# Patient Record
Sex: FEMALE | Race: WHITE | NOT HISPANIC OR LATINO
[De-identification: names, ages, dates, MRNs, and addresses within clinical notes are randomized per-mention and may not be internally consistent; named-entity substitution may affect disease eponyms.]

---

## 2017-01-19 ENCOUNTER — APPOINTMENT (OUTPATIENT)
Dept: WOUND CARE | Facility: CLINIC | Age: 82
End: 2017-01-19

## 2017-01-19 VITALS
TEMPERATURE: 98 F | WEIGHT: 113 LBS | HEIGHT: 62 IN | SYSTOLIC BLOOD PRESSURE: 136 MMHG | HEART RATE: 71 BPM | BODY MASS INDEX: 20.8 KG/M2 | DIASTOLIC BLOOD PRESSURE: 70 MMHG | RESPIRATION RATE: 14 BRPM

## 2017-01-19 DIAGNOSIS — Z79.01 LONG TERM (CURRENT) USE OF ANTICOAGULANTS: ICD-10-CM

## 2017-01-19 RX ORDER — CIPROFLOXACIN HYDROCHLORIDE 250 MG/1
250 TABLET, FILM COATED ORAL
Qty: 10 | Refills: 0 | Status: COMPLETED | COMMUNITY
Start: 2016-10-19

## 2017-01-19 RX ORDER — CEPHALEXIN 500 MG/1
500 CAPSULE ORAL
Qty: 14 | Refills: 0 | Status: COMPLETED | COMMUNITY
Start: 2016-10-24

## 2017-01-19 RX ORDER — NITROFURANTOIN (MONOHYDRATE/MACROCRYSTALS) 25; 75 MG/1; MG/1
100 CAPSULE ORAL
Qty: 14 | Refills: 0 | Status: COMPLETED | COMMUNITY
Start: 2016-08-26

## 2017-01-19 RX ORDER — DOXYCYCLINE HYCLATE 100 MG/1
100 CAPSULE ORAL
Qty: 14 | Refills: 0 | Status: COMPLETED | COMMUNITY
Start: 2016-09-19

## 2017-02-07 ENCOUNTER — APPOINTMENT (OUTPATIENT)
Dept: WOUND CARE | Facility: CLINIC | Age: 82
End: 2017-02-07

## 2017-02-21 ENCOUNTER — APPOINTMENT (OUTPATIENT)
Dept: OPHTHALMOLOGY | Facility: CLINIC | Age: 82
End: 2017-02-21

## 2017-02-28 ENCOUNTER — APPOINTMENT (OUTPATIENT)
Dept: OPHTHALMOLOGY | Facility: CLINIC | Age: 82
End: 2017-02-28

## 2017-04-05 ENCOUNTER — APPOINTMENT (OUTPATIENT)
Dept: OPHTHALMOLOGY | Facility: AMBULATORY SURGERY CENTER | Age: 82
End: 2017-04-05

## 2017-04-06 ENCOUNTER — APPOINTMENT (OUTPATIENT)
Dept: OPHTHALMOLOGY | Facility: CLINIC | Age: 82
End: 2017-04-06

## 2017-04-13 ENCOUNTER — APPOINTMENT (OUTPATIENT)
Dept: OPHTHALMOLOGY | Facility: CLINIC | Age: 82
End: 2017-04-13

## 2017-05-03 ENCOUNTER — APPOINTMENT (OUTPATIENT)
Dept: OPHTHALMOLOGY | Facility: AMBULATORY SURGERY CENTER | Age: 82
End: 2017-05-03

## 2017-05-04 ENCOUNTER — APPOINTMENT (OUTPATIENT)
Dept: OPHTHALMOLOGY | Facility: CLINIC | Age: 82
End: 2017-05-04

## 2017-06-06 ENCOUNTER — APPOINTMENT (OUTPATIENT)
Dept: OPHTHALMOLOGY | Facility: CLINIC | Age: 82
End: 2017-06-06

## 2017-09-12 ENCOUNTER — APPOINTMENT (OUTPATIENT)
Dept: OPHTHALMOLOGY | Facility: CLINIC | Age: 82
End: 2017-09-12
Payer: MEDICARE

## 2017-09-12 PROCEDURE — 92014 COMPRE OPH EXAM EST PT 1/>: CPT

## 2017-11-20 ENCOUNTER — EMERGENCY (EMERGENCY)
Facility: HOSPITAL | Age: 82
LOS: 1 days | Discharge: HOME | End: 2017-11-20
Attending: EMERGENCY MEDICINE | Admitting: EMERGENCY MEDICINE
Payer: MEDICARE

## 2017-11-20 VITALS
TEMPERATURE: 98 F | SYSTOLIC BLOOD PRESSURE: 204 MMHG | OXYGEN SATURATION: 98 % | HEART RATE: 72 BPM | DIASTOLIC BLOOD PRESSURE: 109 MMHG | RESPIRATION RATE: 16 BRPM

## 2017-11-20 DIAGNOSIS — Z90.49 ACQUIRED ABSENCE OF OTHER SPECIFIED PARTS OF DIGESTIVE TRACT: Chronic | ICD-10-CM

## 2017-11-20 LAB
ALBUMIN SERPL ELPH-MCNC: 4 G/DL — SIGNIFICANT CHANGE UP (ref 3.3–5)
ALP SERPL-CCNC: 92 U/L — SIGNIFICANT CHANGE UP (ref 40–120)
ALT FLD-CCNC: 18 U/L RC — SIGNIFICANT CHANGE UP (ref 10–45)
ANION GAP SERPL CALC-SCNC: 15 MMOL/L — SIGNIFICANT CHANGE UP (ref 5–17)
APPEARANCE UR: ABNORMAL
APTT BLD: 31.6 SEC — SIGNIFICANT CHANGE UP (ref 27.5–37.4)
AST SERPL-CCNC: 20 U/L — SIGNIFICANT CHANGE UP (ref 10–40)
BACTERIA # UR AUTO: ABNORMAL /HPF
BASE EXCESS BLDV CALC-SCNC: 3.5 MMOL/L — HIGH (ref -2–2)
BASOPHILS # BLD AUTO: 0 K/UL — SIGNIFICANT CHANGE UP (ref 0–0.2)
BASOPHILS NFR BLD AUTO: 0.4 % — SIGNIFICANT CHANGE UP (ref 0–2)
BILIRUB SERPL-MCNC: 0.6 MG/DL — SIGNIFICANT CHANGE UP (ref 0.2–1.2)
BILIRUB UR-MCNC: NEGATIVE — SIGNIFICANT CHANGE UP
BUN SERPL-MCNC: 35 MG/DL — HIGH (ref 7–23)
CA-I SERPL-SCNC: 1.19 MMOL/L — SIGNIFICANT CHANGE UP (ref 1.12–1.3)
CALCIUM SERPL-MCNC: 9.2 MG/DL — SIGNIFICANT CHANGE UP (ref 8.4–10.5)
CHLORIDE BLDV-SCNC: 105 MMOL/L — SIGNIFICANT CHANGE UP (ref 96–108)
CHLORIDE SERPL-SCNC: 102 MMOL/L — SIGNIFICANT CHANGE UP (ref 96–108)
CK SERPL-CCNC: 28 U/L — SIGNIFICANT CHANGE UP (ref 25–170)
CO2 BLDV-SCNC: 31 MMOL/L — HIGH (ref 22–30)
CO2 SERPL-SCNC: 25 MMOL/L — SIGNIFICANT CHANGE UP (ref 22–31)
COLOR SPEC: SIGNIFICANT CHANGE UP
CREAT SERPL-MCNC: 1.43 MG/DL — HIGH (ref 0.5–1.3)
DIFF PNL FLD: ABNORMAL
EOSINOPHIL # BLD AUTO: 0 K/UL — SIGNIFICANT CHANGE UP (ref 0–0.5)
EOSINOPHIL NFR BLD AUTO: 0.3 % — SIGNIFICANT CHANGE UP (ref 0–6)
GAS PNL BLDV: 141 MMOL/L — SIGNIFICANT CHANGE UP (ref 136–145)
GAS PNL BLDV: SIGNIFICANT CHANGE UP
GAS PNL BLDV: SIGNIFICANT CHANGE UP
GLUCOSE BLDV-MCNC: 144 MG/DL — HIGH (ref 70–99)
GLUCOSE SERPL-MCNC: 150 MG/DL — HIGH (ref 70–99)
GLUCOSE UR QL: NEGATIVE — SIGNIFICANT CHANGE UP
HCO3 BLDV-SCNC: 30 MMOL/L — HIGH (ref 21–29)
HCT VFR BLD CALC: 39.6 % — SIGNIFICANT CHANGE UP (ref 34.5–45)
HCT VFR BLDA CALC: 40 % — SIGNIFICANT CHANGE UP (ref 39–50)
HGB BLD CALC-MCNC: 12.9 G/DL — SIGNIFICANT CHANGE UP (ref 11.5–15.5)
HGB BLD-MCNC: 13 G/DL — SIGNIFICANT CHANGE UP (ref 11.5–15.5)
HOROWITZ INDEX BLDV+IHG-RTO: SIGNIFICANT CHANGE UP
INR BLD: 1.21 RATIO — HIGH (ref 0.88–1.16)
KETONES UR-MCNC: NEGATIVE — SIGNIFICANT CHANGE UP
LACTATE BLDV-MCNC: 1.3 MMOL/L — SIGNIFICANT CHANGE UP (ref 0.7–2)
LEUKOCYTE ESTERASE UR-ACNC: ABNORMAL
LYMPHOCYTES # BLD AUTO: 0.4 K/UL — LOW (ref 1–3.3)
LYMPHOCYTES # BLD AUTO: 4.3 % — LOW (ref 13–44)
MCHC RBC-ENTMCNC: 31.8 PG — SIGNIFICANT CHANGE UP (ref 27–34)
MCHC RBC-ENTMCNC: 32.8 GM/DL — SIGNIFICANT CHANGE UP (ref 32–36)
MCV RBC AUTO: 96.9 FL — SIGNIFICANT CHANGE UP (ref 80–100)
MONOCYTES # BLD AUTO: 0.3 K/UL — SIGNIFICANT CHANGE UP (ref 0–0.9)
MONOCYTES NFR BLD AUTO: 3.9 % — SIGNIFICANT CHANGE UP (ref 2–14)
NEUTROPHILS # BLD AUTO: 8.2 K/UL — HIGH (ref 1.8–7.4)
NEUTROPHILS NFR BLD AUTO: 91.1 % — HIGH (ref 43–77)
NITRITE UR-MCNC: POSITIVE
NT-PROBNP SERPL-SCNC: 2890 PG/ML — HIGH (ref 0–300)
PCO2 BLDV: 53 MMHG — HIGH (ref 35–50)
PH BLDV: 7.36 — SIGNIFICANT CHANGE UP (ref 7.35–7.45)
PH UR: 7.5 — SIGNIFICANT CHANGE UP (ref 5–8)
PLATELET # BLD AUTO: 106 K/UL — LOW (ref 150–400)
PO2 BLDV: 32 MMHG — SIGNIFICANT CHANGE UP (ref 25–45)
POTASSIUM BLDV-SCNC: 4.5 MMOL/L — SIGNIFICANT CHANGE UP (ref 3.5–5)
POTASSIUM SERPL-MCNC: 4.7 MMOL/L — SIGNIFICANT CHANGE UP (ref 3.5–5.3)
POTASSIUM SERPL-SCNC: 4.7 MMOL/L — SIGNIFICANT CHANGE UP (ref 3.5–5.3)
PROT SERPL-MCNC: 7.7 G/DL — SIGNIFICANT CHANGE UP (ref 6–8.3)
PROT UR-MCNC: 100 MG/DL
PROTHROM AB SERPL-ACNC: 13.2 SEC — HIGH (ref 9.8–12.7)
RBC # BLD: 4.08 M/UL — SIGNIFICANT CHANGE UP (ref 3.8–5.2)
RBC # FLD: 13.9 % — SIGNIFICANT CHANGE UP (ref 10.3–14.5)
RBC CASTS # UR COMP ASSIST: SIGNIFICANT CHANGE UP /HPF (ref 0–2)
SAO2 % BLDV: 52 % — LOW (ref 67–88)
SODIUM SERPL-SCNC: 142 MMOL/L — SIGNIFICANT CHANGE UP (ref 135–145)
SP GR SPEC: 1.01 — SIGNIFICANT CHANGE UP (ref 1.01–1.02)
TROPONIN T SERPL-MCNC: <0.01 NG/ML — SIGNIFICANT CHANGE UP (ref 0–0.06)
UROBILINOGEN FLD QL: NEGATIVE — SIGNIFICANT CHANGE UP
WBC # BLD: 9 K/UL — SIGNIFICANT CHANGE UP (ref 3.8–10.5)
WBC # FLD AUTO: 9 K/UL — SIGNIFICANT CHANGE UP (ref 3.8–10.5)
WBC UR QL: >50 /HPF (ref 0–5)

## 2017-11-20 PROCEDURE — 93010 ELECTROCARDIOGRAM REPORT: CPT

## 2017-11-20 PROCEDURE — 99285 EMERGENCY DEPT VISIT HI MDM: CPT | Mod: 25

## 2017-11-20 RX ORDER — LABETALOL HCL 100 MG
10 TABLET ORAL ONCE
Qty: 0 | Refills: 0 | Status: COMPLETED | OUTPATIENT
Start: 2017-11-20 | End: 2017-11-20

## 2017-11-20 RX ORDER — CEFTRIAXONE 500 MG/1
1 INJECTION, POWDER, FOR SOLUTION INTRAMUSCULAR; INTRAVENOUS ONCE
Qty: 0 | Refills: 0 | Status: COMPLETED | OUTPATIENT
Start: 2017-11-20 | End: 2017-11-20

## 2017-11-20 RX ORDER — ONDANSETRON 8 MG/1
4 TABLET, FILM COATED ORAL ONCE
Qty: 0 | Refills: 0 | Status: COMPLETED | OUTPATIENT
Start: 2017-11-20 | End: 2017-11-20

## 2017-11-20 RX ADMIN — ONDANSETRON 4 MILLIGRAM(S): 8 TABLET, FILM COATED ORAL at 19:08

## 2017-11-20 RX ADMIN — Medication 10 MILLIGRAM(S): at 19:08

## 2017-11-20 RX ADMIN — ONDANSETRON 4 MILLIGRAM(S): 8 TABLET, FILM COATED ORAL at 21:31

## 2017-11-20 RX ADMIN — CEFTRIAXONE 100 GRAM(S): 500 INJECTION, POWDER, FOR SOLUTION INTRAMUSCULAR; INTRAVENOUS at 21:31

## 2017-11-20 NOTE — ED ADULT NURSE REASSESSMENT NOTE - NS ED NURSE REASSESS COMMENT FT1
Patient reports nausea. Zofran IV given. UTI dx from urine sample. Ceftriaxone administered. Awaiting CT results. Family at bedside.

## 2017-11-20 NOTE — ED PROVIDER NOTE - NS ED ROS FT
ROS:   constitutional - no fever, no chills  eyes - no visual changes, no redness  eent - no sore throat, no nasal congestion  cvs - no chest pain, no leg swelling  resp - no shortness of breath, no cough  gi - + abdominal pain, + vomiting, no diarrhea  gu - no dysuria, no hematuria  msk - no acute back pain, no joint swelling  skin - no rashes, no jaundice  neuro - no headache, no focal weakness  psych - no acute mental health issue

## 2017-11-20 NOTE — ED PROVIDER NOTE - ATTENDING CONTRIBUTION TO CARE
[I agree with scribe documentation except where as noted below]   89 yof pmhx cad, chf diastolic per daughter, as (unk severity), htn, aflutter on ac, presents with nausea and vomiting x2 today. pt w prior cholecystectomy and no other abd surg. pt lives at home w HHA  who states today pt was feeling weak. after breakfast felt nauseated and vomited x 2. no f/c. was seen at urgent care and sent in for gen weakness and htn. daughter states "every time she is in the hospital they give her too much fluid." pt herself c/o epig abd pain mod.     ROS:   constitutional - no fever, no chills  eyes - no visual changes, no redness  eent - no sore throat, no nasal congestion  cvs - no chest pain, no leg swelling  resp - no shortness of breath, no cough  gi - + abdominal pain, + vomiting, no diarrhea  gu - no dysuria, no hematuria  msk - no acute back pain, no joint swelling  skin - no rashes, no jaundice  neuro - no headache, no focal weakness  psych - no acute mental health issue     Physical Exam:   constitutional - well appearing, awake and alert, oriented x3  head - no external evidence of trauma  cvs - rrr, no murmurs, no peripheral edema  resp - breath sounds clear and equal bilat  gi - abdomen soft with diffuse epigastric and upper abd tenderness, no rigidity, guarding or rebound, bowel sounds present but diminished  msk - moving all extremities spontaneously  neuro - alert and oriented x3, no focal deficits, CNs 2-12 grossly intact, strength 5/5 in all ext. no pronator drift.   skin- no jaundice, warm and dry  psych - mood and affect wnl, no apparent risk to self or others     ? sbo vs other intraabd pathology. pt w also htn systolic in 200s, ? hypertensive urgency - will do ct head and look for other signs of end organ damage. HUGH Miller MD

## 2017-11-20 NOTE — ED PROVIDER NOTE - NS_ ATTENDINGSCRIBEDETAILS _ED_A_ED_FT
[I agree with scribe documentation except where as noted below]   89 yof pmhx cad, chf diastolic per daughter, as (unk severity), htn, aflutter on ac, presents with nausea and vomiting x2 today. pt w prior cholecystectomy and no other abd surg. pt lives at home w HHA  who states today pt was feeling weak. after breakfast felt nauseated and vomited x 2. no f/c. was seen at urgent care and sent in for gen weakness and htn. daughter states "every time she is in the hospital they give her too much fluid." pt herself c/o epig abd pain mod.     ros and pe noted above.    ? sbo vs other intraabd pathology. pt w also htn systolic in 200s, ? hypertensive urgency - will do ct head and look for other signs of end organ damage. labs/ua + for UTI, ct a/p without any other acute intraabd pathology except findings suggestive of uti - will admit for ongoing management. HUGH Miller MD

## 2017-11-20 NOTE — ED ADULT NURSE REASSESSMENT NOTE - NS ED NURSE REASSESS COMMENT FT1
Report received from Isa DOSS. Patient A&Ox1, family at bedside. Awaiting CT. BP decreases after repeat vitals.

## 2017-11-20 NOTE — ED PROVIDER NOTE - PMH
A-fib    Aortic stenosis    CAD (coronary artery disease)    CHF (congestive heart failure)    CKD (chronic kidney disease)    HLD (hyperlipidemia)    HTN (hypertension)    Thrombocytopenia

## 2017-11-20 NOTE — ED PROVIDER NOTE - PHYSICAL EXAMINATION
Physical Exam:   constitutional - well appearing, awake and alert, oriented x3  head - no external evidence of trauma  cvs - rrr, no murmurs, no peripheral edema  resp - breath sounds clear and equal bilat  gi - abdomen soft with diffuse epigastric and upper abd tenderness, no rigidity, guarding or rebound, bowel sounds present but diminished  msk - moving all extremities spontaneously  neuro - alert and oriented x3, no focal deficits, CNs 2-12 grossly intact, strength 5/5 in all ext. no pronator drift.   skin- no jaundice, warm and dry  psych - mood and affect wnl, no apparent risk to self or others

## 2017-11-20 NOTE — ED PROVIDER NOTE - OBJECTIVE STATEMENT
89 y/o F pt with PMHx of HTN, a-fib, HLD, CAD, CHF, CKD, ITP, thrombocytopenia (s/p bone marrow biopsy), aortic stenosis,  PSHx of cholecystectomy c/o nausea and vomiting with dizziness. As per aide, pt had soup for lunch and around 15 minutes later, she vomited. Pt was able to walk to the car to visit urgent care but vomited in the car as well. Noted high BP at 208/104. Completed blood work which showed low platelets and high WBC at 13. Urgent care sent pt to the ED. As per daughter, pt has a low platelet count. Is currently following up with hematologist. Denies fever or any other complaints. Current medication: Metroprolol, Lasix 40mg 89 y/o F pt with PMHx of HTN, a-fib, HLD, CAD, CHF, CKD, ITP, thrombocytopenia (s/p bone marrow biopsy), aortic stenosis,  PSHx of cholecystectomy c/o nausea and vomiting with dizziness. As per aide, pt had soup for lunch and around 15 minutes later, she vomited. Pt was able to walk to the car to visit urgent care but vomited in the car as well. Noted high BP at 208/104. Completed blood work which showed low platelets and high WBC at 13. Urgent care sent pt to the ED. As per daughter, pt has a low platelet count. Is currently following up with hematologist. Denies fever or any other complaints. Current medication: Metroprolol, Lasix 40mg, Eliquis 89 y/o F pt with PMHx of HTN, a-fib, HLD, CAD, CHF, CKD, ITP, thrombocytopenia (s/p bone marrow biopsy), aortic stenosis,  PSHx of cholecystectomy c/o nausea and vomiting with dizziness. As per aide, pt had soup for lunch and around 15 minutes later, she vomited. Pt was able to walk to the car to visit urgent care but vomited in the car as well. Noted high BP at 208/104. Completed blood work which showed low platelets and high WBC at 13. Urgent care sent pt to the ED. As per daughter, pt has a low platelet count. Is currently following up with hematologist. Denies fever or any other complaints. Current medication: Metroprolol, Lasix 40mg, Eliquis    PMD Dixon Abraham

## 2017-11-21 DIAGNOSIS — E03.9 HYPOTHYROIDISM, UNSPECIFIED: ICD-10-CM

## 2017-11-21 DIAGNOSIS — I48.91 UNSPECIFIED ATRIAL FIBRILLATION: ICD-10-CM

## 2017-11-21 DIAGNOSIS — F32.9 MAJOR DEPRESSIVE DISORDER, SINGLE EPISODE, UNSPECIFIED: ICD-10-CM

## 2017-11-21 DIAGNOSIS — I16.0 HYPERTENSIVE URGENCY: ICD-10-CM

## 2017-11-21 DIAGNOSIS — D69.6 THROMBOCYTOPENIA, UNSPECIFIED: ICD-10-CM

## 2017-11-21 DIAGNOSIS — N18.3 CHRONIC KIDNEY DISEASE, STAGE 3 (MODERATE): ICD-10-CM

## 2017-11-21 DIAGNOSIS — G93.41 METABOLIC ENCEPHALOPATHY: ICD-10-CM

## 2017-11-21 DIAGNOSIS — N39.0 URINARY TRACT INFECTION, SITE NOT SPECIFIED: ICD-10-CM

## 2017-11-21 DIAGNOSIS — I50.9 HEART FAILURE, UNSPECIFIED: ICD-10-CM

## 2017-11-21 DIAGNOSIS — Z29.9 ENCOUNTER FOR PROPHYLACTIC MEASURES, UNSPECIFIED: ICD-10-CM

## 2017-11-21 LAB
ANION GAP SERPL CALC-SCNC: 12 MMOL/L — SIGNIFICANT CHANGE UP (ref 5–17)
BASOPHILS # BLD AUTO: 0 K/UL — SIGNIFICANT CHANGE UP (ref 0–0.2)
BASOPHILS NFR BLD AUTO: 0.6 % — SIGNIFICANT CHANGE UP (ref 0–2)
BUN SERPL-MCNC: 30 MG/DL — HIGH (ref 7–23)
CALCIUM SERPL-MCNC: 8.4 MG/DL — SIGNIFICANT CHANGE UP (ref 8.4–10.5)
CHLORIDE SERPL-SCNC: 103 MMOL/L — SIGNIFICANT CHANGE UP (ref 96–108)
CO2 SERPL-SCNC: 26 MMOL/L — SIGNIFICANT CHANGE UP (ref 22–31)
CREAT SERPL-MCNC: 1.34 MG/DL — HIGH (ref 0.5–1.3)
EOSINOPHIL # BLD AUTO: 0.1 K/UL — SIGNIFICANT CHANGE UP (ref 0–0.5)
EOSINOPHIL NFR BLD AUTO: 1.1 % — SIGNIFICANT CHANGE UP (ref 0–6)
GLUCOSE SERPL-MCNC: 104 MG/DL — HIGH (ref 70–99)
HCT VFR BLD CALC: 32.9 % — LOW (ref 34.5–45)
HGB BLD-MCNC: 10.6 G/DL — LOW (ref 11.5–15.5)
LYMPHOCYTES # BLD AUTO: 0.6 K/UL — LOW (ref 1–3.3)
LYMPHOCYTES # BLD AUTO: 10.6 % — LOW (ref 13–44)
MAGNESIUM SERPL-MCNC: 2.2 MG/DL — SIGNIFICANT CHANGE UP (ref 1.6–2.6)
MCHC RBC-ENTMCNC: 30.9 PG — SIGNIFICANT CHANGE UP (ref 27–34)
MCHC RBC-ENTMCNC: 32.1 GM/DL — SIGNIFICANT CHANGE UP (ref 32–36)
MCV RBC AUTO: 96.4 FL — SIGNIFICANT CHANGE UP (ref 80–100)
MONOCYTES # BLD AUTO: 0.5 K/UL — SIGNIFICANT CHANGE UP (ref 0–0.9)
MONOCYTES NFR BLD AUTO: 8 % — SIGNIFICANT CHANGE UP (ref 2–14)
NEUTROPHILS # BLD AUTO: 4.6 K/UL — SIGNIFICANT CHANGE UP (ref 1.8–7.4)
NEUTROPHILS NFR BLD AUTO: 79.8 % — HIGH (ref 43–77)
PHOSPHATE SERPL-MCNC: 4 MG/DL — SIGNIFICANT CHANGE UP (ref 2.5–4.5)
PLATELET # BLD AUTO: 90 K/UL — LOW (ref 150–400)
POTASSIUM SERPL-MCNC: 4.3 MMOL/L — SIGNIFICANT CHANGE UP (ref 3.5–5.3)
POTASSIUM SERPL-SCNC: 4.3 MMOL/L — SIGNIFICANT CHANGE UP (ref 3.5–5.3)
RBC # BLD: 3.41 M/UL — LOW (ref 3.8–5.2)
RBC # FLD: 13.8 % — SIGNIFICANT CHANGE UP (ref 10.3–14.5)
SODIUM SERPL-SCNC: 141 MMOL/L — SIGNIFICANT CHANGE UP (ref 135–145)
WBC # BLD: 5.7 K/UL — SIGNIFICANT CHANGE UP (ref 3.8–10.5)
WBC # FLD AUTO: 5.7 K/UL — SIGNIFICANT CHANGE UP (ref 3.8–10.5)

## 2017-11-21 RX ORDER — CEFTRIAXONE 500 MG/1
1 INJECTION, POWDER, FOR SOLUTION INTRAMUSCULAR; INTRAVENOUS EVERY 24 HOURS
Qty: 0 | Refills: 0 | Status: DISCONTINUED | OUTPATIENT
Start: 2017-11-21 | End: 2017-11-22

## 2017-11-21 RX ORDER — AMLODIPINE BESYLATE 2.5 MG/1
10 TABLET ORAL DAILY
Qty: 0 | Refills: 0 | Status: DISCONTINUED | OUTPATIENT
Start: 2017-11-21 | End: 2017-11-21

## 2017-11-21 RX ORDER — LEVOTHYROXINE SODIUM 125 MCG
25 TABLET ORAL DAILY
Qty: 0 | Refills: 0 | Status: DISCONTINUED | OUTPATIENT
Start: 2017-11-21 | End: 2017-11-22

## 2017-11-21 RX ORDER — ATORVASTATIN CALCIUM 80 MG/1
20 TABLET, FILM COATED ORAL AT BEDTIME
Qty: 0 | Refills: 0 | Status: DISCONTINUED | OUTPATIENT
Start: 2017-11-21 | End: 2017-11-22

## 2017-11-21 RX ORDER — APIXABAN 2.5 MG/1
2.5 TABLET, FILM COATED ORAL EVERY 12 HOURS
Qty: 0 | Refills: 0 | Status: DISCONTINUED | OUTPATIENT
Start: 2017-11-21 | End: 2017-11-22

## 2017-11-21 RX ORDER — AMLODIPINE BESYLATE 2.5 MG/1
5 TABLET ORAL DAILY
Qty: 0 | Refills: 0 | Status: DISCONTINUED | OUTPATIENT
Start: 2017-11-21 | End: 2017-11-22

## 2017-11-21 RX ORDER — LEVOTHYROXINE SODIUM 125 MCG
50 TABLET ORAL DAILY
Qty: 0 | Refills: 0 | Status: DISCONTINUED | OUTPATIENT
Start: 2017-11-21 | End: 2017-11-21

## 2017-11-21 RX ADMIN — AMLODIPINE BESYLATE 5 MILLIGRAM(S): 2.5 TABLET ORAL at 11:32

## 2017-11-21 RX ADMIN — CEFTRIAXONE 100 GRAM(S): 500 INJECTION, POWDER, FOR SOLUTION INTRAMUSCULAR; INTRAVENOUS at 22:16

## 2017-11-21 RX ADMIN — ATORVASTATIN CALCIUM 20 MILLIGRAM(S): 80 TABLET, FILM COATED ORAL at 22:16

## 2017-11-21 RX ADMIN — APIXABAN 2.5 MILLIGRAM(S): 2.5 TABLET, FILM COATED ORAL at 18:00

## 2017-11-21 NOTE — DIETITIAN INITIAL EVALUATION ADULT. - ORAL INTAKE PTA
good/Pt consumes 3 meals/day + snacks. Breakfast: toast/[place] toast, fruit, coffee. Lunch: cream soup or sandwich. Dinner: pasta, meat/pork. Snacks: baked goods, ice cream. Pt takes Vitamin B12, MVI, and calcium PTA. Pt confirms NKFA.

## 2017-11-21 NOTE — H&P ADULT - NSHPPHYSICALEXAM_GEN_ALL_CORE
Vital Signs Last 24 Hrs  T(C): 36.7 (20 Nov 2017 19:00), Max: 36.8 (20 Nov 2017 18:09)  T(F): 98.1 (20 Nov 2017 19:00), Max: 98.2 (20 Nov 2017 18:09)  HR: 74 (20 Nov 2017 23:43) (71 - 86)  BP: 163/77 (20 Nov 2017 23:43) (163/77 - 238/79)  BP(mean): --  RR: 20 (20 Nov 2017 23:43) (16 - 20)  SpO2: 97% (20 Nov 2017 23:43) (97% - 99%)    GENERAL: NAD, frail  HEAD:  Atraumatic, Normocephalic  EYES: EOMI, PERRLA, conjunctiva and sclera clear  Mouth: MMM, no lesions  NECK: Supple, no appreciable masses, no jvd appreciated  Lung: normal work of breathing, mild crackles at bases b/l  Chest: S1&S2+, irregular rhythm w/ normal rate, no m/r/g appreciated  ABDOMEN: bs+, soft, nt, nd, no appreciable masses  : No sierra catheter, no CVA tenderness  EXTREMITIES:  Peripheral Pulses Present in all extremities., No clubbing, cyanosis, or edema. noted right arm laceration being bandaged by ED RN & attending  Neuro: A&Ox1(self), no focal deficits appreciated, patient noted to be delirious stating that she lives with her parents in [place] andthought she was in her bedroom there  SKIN: warm and dry, no visible rashes Vital Signs Last 24 Hrs  T(C): 36.7 (20 Nov 2017 19:00), Max: 36.8 (20 Nov 2017 18:09)  T(F): 98.1 (20 Nov 2017 19:00), Max: 98.2 (20 Nov 2017 18:09)  HR: 74 (20 Nov 2017 23:43) (71 - 86)  BP: 163/77 (20 Nov 2017 23:43) (163/77 - 238/79)  BP(mean): --  RR: 20 (20 Nov 2017 23:43) (16 - 20)  SpO2: 97% (20 Nov 2017 23:43) (97% - 99%)    GENERAL: NAD, frail  HEAD:  Atraumatic, Normocephalic  EYES: EOMI, PERRLA, conjunctiva and sclera clear  Mouth: MMM, no lesions  NECK: Supple, no appreciable masses, no jvd appreciated  Lung: normal work of breathing, mild crackles at bases b/l  Chest: S1&S2+, irregular rhythm w/ normal rate, no m/r/g appreciated  ABDOMEN: bs+, soft, nt, nd, no appreciable masses  : No sierra catheter, no CVA tenderness  EXTREMITIES:  Peripheral Pulses Present in all extremities., No clubbing, cyanosis, or edema. noted right arm laceration being bandaged by ED RN & attending  Neuro: A&Ox1(self), no focal deficits appreciated in cranial nerves II-XII or in extremities, patient noted to be delirious stating that she lives with her parents in [place] andthought she was in her bedroom there  SKIN: warm and dry, no visible rashes, noted right arm laceration (iatrogenic when patient was moved in bed in the ed)

## 2017-11-21 NOTE — PROGRESS NOTE ADULT - PROBLEM SELECTOR PLAN 10
DVT ppx: patient is on apixiban TID as outpatient- unclear what the indication for this would be. Started here on eliquis BID.   Aspiration precaution,, fall precautions  Diet- soft, sodium restricted.    James Christianson MD  Internal Medicine PGY-1  Pager: [phone]/ [phone]  After 7 PM on weekdays and 12 PM on weekends page [phone]

## 2017-11-21 NOTE — DIETITIAN INITIAL EVALUATION ADULT. - OTHER INFO
Pt seen for N/V >3 days PTA. Pt's aid at bedside for hx. Pt with good appetite, declines supplements at this time.  Pt's aid stated pt's UBW is 112pounds - stable x2 years with no recent weight changes. Pt's aid reports pt weighed 120pounds about 2 years ago with weight loss d/t N/V with UTI. Per chart, pt's current weight is 125.2pounds, ?accuracy of weight vs difference in scales. Pt reports 1 day N/V PTA, none while in house. Pt denies any constipation or diarrhea with last BM reported yesterday. Pt denies any chewing/swallowing difficulties. pt's aid states she cuts foods small.

## 2017-11-21 NOTE — H&P ADULT - PROBLEM SELECTOR PLAN 6
charted hx of ITP  monitor w/ cbc daily charted hx of heart failure. no signs on exam. no jvd, can lay flat w/o sob  - will need day team to f/u w/ family if they can be reached at that time

## 2017-11-21 NOTE — H&P ADULT - PROBLEM SELECTOR PLAN 7
charted hx of syntrhoid use  will order for tsh. will need day team to f/u w/ family and med rec charted hx of ITP  monitor w/ cbc daily

## 2017-11-21 NOTE — PROGRESS NOTE ADULT - PROBLEM SELECTOR PLAN 6
-severe systolic HF as per family  -followed by Dr. Abraham (cardiologist-- [place]) -severe systolic HF as per family  -followed by Dr. Abraham (cardiologist-- [place])  -hold metoprolol, lasix, losartan given dramatic change in BP since presentation, not fluid overloaded on exam

## 2017-11-21 NOTE — PROGRESS NOTE ADULT - PROBLEM SELECTOR PLAN 5
EKG c/w afib. Currently rate controlled. EKG c/w afib. Currently rate controlled.  c/w eliquis for a/c  hold metoprolol given relative hypotension since admission

## 2017-11-21 NOTE — H&P ADULT - PROBLEM SELECTOR PLAN 1
Patient presenting w/ n/v and noted to be delirious on exam 2/2 UTI per UA  - Unable to obtain history given patient's mental status and unable to reach family per phone numbers listed  - dosed w/ ceftriaxone in the ED, urine cx collected. will collect blood cx given metabolic encephalopathy and urgent care wbc documenting wbc to 13  -sepsis criteria not met

## 2017-11-21 NOTE — DIETITIAN INITIAL EVALUATION ADULT. - ADHERENCE
fair/Pt's aid stated they monitor pt's sodium intake. Pt does not use salt at the table and the aid uses minimal salt in cooking.

## 2017-11-21 NOTE — H&P ADULT - PROBLEM SELECTOR PLAN 8
charted hx of sertraline. will need med rec before continueing charted hx of syntrhoid use  will order for tsh. will need day team to f/u w/ family and med rec

## 2017-11-21 NOTE — H&P ADULT - ATTENDING COMMENTS
89F w/ HTN, Afib, HLD, CAD s/p stent 2013, CKD, ITP, and aortic stenosis presented to urgent care for n/v , fount to be hypertensive SBP > 230 , delirious , labs with elevated creatinine ( no baseline) , positive UA; will treat for UTI and monitor clinically for improvement, If remains hypertensive and encephalopathic would consider MRI brain to r/o PRes. Family unavailable at time of history to provide history and and baseline cognitive function , Day team will obtain further history and reconcile medications.

## 2017-11-21 NOTE — H&P ADULT - HISTORY OF PRESENT ILLNESS
Source of Information: outpatient urgent care records and ED, family was unable to be reached at time of medicine admission by telephone    89F of HTN, Afib, HLD, CAD s/p stent 2013, CKD, ITP (s/p bone marrow biopsy), and aortic stenosis was sent to [facility] from urgent care for n/v and was from there sent to ED. Her baseline mental status is unknown. The patient went to her local urgent care yesterday for two episodes of vomiting, dizziness, headache, and a non-productive cough. At urgent care, the patient was found to be hypertensive (188/88), have a leukocytosis (13) on CBC, and a negative rapid flu; she was sent to the ED at [facility] for further evaluation.     In the ED, the patient was found to be confused (AOx1), hypertensive (238/79), for which she was given labetalol, and had a positive UA, for which she was started on ceftriaxone. CT headed demonstrated microvascular changes but no masses or hemorrhage and CTAP demonstrated no SBO, diverticulosis, diffuse haziness surrounding the urinary bladder, pulmonary interstitial edema, small b/l pleural effusions, cardiomegaly and trace pelvic free fluid.

## 2017-11-21 NOTE — DIETITIAN INITIAL EVALUATION ADULT. - DOB: +DATEOFBIRTH
Mason returns after laparoscopic cholecystectomy.  The patient has done well and had the anticipated amount of discomfort for this procedure.  The patient has no complaints or concerns at this time.    On exam the wound is Clean, dry and intact.  There is no evidence of fluctuance, erythema, or infection.    Drain removed n/a    Assessment:  Status post laparoscopic cholecystectomy    Plan:  Patient to follow up as needed.  If the patient has any questions or concerns they will call or return to the clinic.        
Statement Selected

## 2017-11-21 NOTE — H&P ADULT - PROBLEM SELECTOR PLAN 2
metabolic encephalopathy 2/2 UTI  - c/w ceftriaxone as above. metabolic encephalopathy 2/2 UTI  - c/w ceftriaxone as above.  -ct head negative for bleed, mass, or midline shift  given HTN urgency if patient not improving w/ abx, should consider HTN emergency. Less suspicion at this time as patient's mental status is consistent w/ delirium

## 2017-11-21 NOTE — H&P ADULT - PROBLEM SELECTOR PLAN 3
patient noted to have hypertensive urgency in the ED. responsive to labetalol 10mg IV in ED. unclear home meds, likely missed medications given n/v reported per chart. Will treat w/ labetalol IV prn patient noted to have hypertensive urgency in the ED. responsive to labetalol 10mg IV in ED. unclear home meds, likely missed medications given n/v reported per chart. Will treat w/ labetalol IV prn  - no end organ damage appreciated at this time

## 2017-11-21 NOTE — H&P ADULT - ASSESSMENT
89F of HTN, Afib, HLD, CAD s/p stent 2013, CKD, ITP (s/p bone marrow biopsy), and aortic stenosis was sent to [facility] from urgent care for n/v and was from there sent to ED and is admitted to medicine for UTI c/b metabolic encephalopathy

## 2017-11-21 NOTE — ED ADULT NURSE REASSESSMENT NOTE - NS ED NURSE REASSESS COMMENT FT1
CT and UA correlate for UTI, patient given ceftriaxone. Admit to medicine for UTI. VSS. Report called to Pallavi roberts

## 2017-11-21 NOTE — DIETITIAN INITIAL EVALUATION ADULT. - PROBLEM SELECTOR PLAN 2
metabolic encephalopathy 2/2 UTI  - c/w ceftriaxone as above.  -ct head negative for bleed, mass, or midline shift  given HTN urgency if patient not improving w/ abx, should consider HTN emergency. Less suspicion at this time as patient's mental status is consistent w/ delirium

## 2017-11-21 NOTE — DIETITIAN INITIAL EVALUATION ADULT. - PROBLEM SELECTOR PLAN 6
charted hx of heart failure. no signs on exam. no jvd, can lay flat w/o sob  - will need day team to f/u w/ family if they can be reached at that time

## 2017-11-21 NOTE — PROGRESS NOTE ADULT - PROBLEM SELECTOR PLAN 4
Likely JOHN on CKD, Cr improving 1.43-->1.3 today Likely JOHN on CKD vs CKD, Cr improving 1.43-->1.3 today

## 2017-11-21 NOTE — PROGRESS NOTE ADULT - ASSESSMENT
89F of HTN, Afib, HLD, CAD s/p stent 2013, CKD, ITP (s/p bone marrow biopsy), and aortic stenosis was sent to [facility] from urgent care for n/v, now admitted  for UTI and hypertensive urgency.

## 2017-11-21 NOTE — PROGRESS NOTE ADULT - PROBLEM SELECTOR PLAN 1
Patient presenting w/ n/v and noted to be delirious on exam, however as per family at bedside patient is at baseline mental status.   -does not meet sepsis criteria  -given nausea and vomiting prior to presentation, and grossly positive UA will classify as symptomatic UTI and continue treatment with IV ceftriaxone, today day 2.   -f/u urine cx results, blood cx

## 2017-11-21 NOTE — DIETITIAN INITIAL EVALUATION ADULT. - ENERGY NEEDS
ht.60inches wt.125.2pounds BMI:24.2kg/m2 IBW:100pounds(+/-10%) %IBW:125%  Pt is 89 yo F with hx of HTN, Afib, HLD, CAD S/P stent 2013, CKD, ITP (S/P bone marrow biopsy), and aortic stenosis sent in from urgent care for N/V.  No Edema noted  No Pressure Ulcers noted  Lian Rivera MBA RDN CDN Pager [phone]

## 2017-11-21 NOTE — H&P ADULT - PROBLEM SELECTOR PLAN 9
DVT ppx: patient is on apixiban BID-will need day team to continue to assess given Cr 1.4  AAspiration precaution, NPO except meds until not delirious, fall precautions  Day team will need day team to reattempt to reach the family charted hx of sertraline. will need med rec before continueing

## 2017-11-21 NOTE — ED ADULT NURSE REASSESSMENT NOTE - NS ED NURSE REASSESS COMMENT FT1
Patient sustain skin tear to right anterior forearm and right elbow during boost up in bed. Skin tear cleaned and wrapped up with gauze. Immediately prior to transfer to admitted bed, patient sustain skin tear to right anterior forearm and right elbow while being pulled up in bed. Angela WELLS notified and assess patient immediately. Skin tear cleaned and wrapped up with gauze. Immediately prior to transfer to admitted bed, patient sustain skin tear to right anterior forearm and right elbow while being pulled up in bed. Angela WELLS notified and assess patient immediately. Skin tear cleaned, steri strips placed and wrapped up with gauze.

## 2017-11-21 NOTE — H&P ADULT - PROBLEM SELECTOR PLAN 4
afib w/ aflutter variable conduction at normal rate  - will need home med rec. c/w betablocker prn charted CKDIII, unclear baseline

## 2017-11-21 NOTE — PROGRESS NOTE ADULT - SUBJECTIVE AND OBJECTIVE BOX
Patient is a 89y old  Female who presents with a chief complaint of brought by family from urgent care due to N/V (2017 02:52)    SUBJECTIVE / OVERNIGHT EVENTS: No overnight events. No complaints this AM. ROS limited due to AO x 1 mental status, however no N/V/D/abdominal pain/F/C.     MEDICATIONS  (STANDING):  amLODIPine   Tablet 5 milliGRAM(s) Oral daily  apixaban 2.5 milliGRAM(s) Oral every 12 hours  atorvastatin 20 milliGRAM(s) Oral at bedtime  cefTRIAXone   IVPB 1 Gram(s) IV Intermittent every 24 hours  levothyroxine 25 MICROGram(s) Oral daily    MEDICATIONS  (PRN):    Vitals  T(C): 36.3 (17 @ 13:51), Max: 36.8 (17 @ 18:09)  HR: 58 (17 @ 13:51) (58 - 86)  BP: 115/57 (17 @ 13:51) (115/57 - 238/79)  RR: 18 (17 @ 13:51) (16 - 20)  SpO2: 99% (17 @ 13:51) (96% - 99%)    PHYSICAL EXAM  GENERAL: NAD, well-developed  NEURO: AO x3, PERRLA, EOMI, motor strength in tact in 4/4 extremities, sensation in tact  HEAD:  Atraumatic, Normocephalic  EYES: conjunctiva and sclera clear  NECK: Supple, No JVD, no lymphadenopathy, no thyromegaly  CHEST/LUNG: Clear to auscultation bilaterally; No wheezes, rales or rhonchi  HEART: Regular rate and rhythm; No murmurs, rubs, or gallops  ABDOMEN: Soft, Nontender, Nondistended; Bowel sounds present, no masses.  EXTREMITIES:  2+ Peripheral Pulses, No clubbing, cyanosis, or edema  SKIN: Warm, dry, in tact, no rashes or lesions  PSYCH: affect appropriate    LABS:                        10.6   5.7   )-----------( 90       ( 2017 07:14 )             32.9     -    141  |  103  |  30<H>  ----------------------------<  104<H>  4.3   |  26  |  1.34<H>    Ca    8.4      2017 07:13  Phos  4.0     -  Mg     2.2     -    TPro  7.7  /  Alb  4.0  /  TBili  0.6  /  DBili  x   /  AST  20  /  ALT  18  /  AlkPhos  92  11-20    PT/INR - ( 2017 18:54 )   PT: 13.2 sec;   INR: 1.21 ratio         PTT - ( 2017 18:54 )  PTT:31.6 sec  CARDIAC MARKERS ( 2017 18:54 )  x     / <0.01 ng/mL / 28 U/L / x     / x          Urinalysis Basic - ( 2017 19:10 )    Color: PL Yellow / Appearance: SL Turbid / S.013 / pH: x  Gluc: x / Ketone: Negative  / Bili: Negative / Urobili: Negative   Blood: x / Protein: 100 mg/dL / Nitrite: Positive   Leuk Esterase: Large / RBC: 3-5 /HPF / WBC >50 /HPF   Sq Epi: x / Non Sq Epi: x / Bacteria: Many /HPF    James Christianson MD  Internal Medicine PGY-1  Pager: [phone]/ [phone] [phone]  After 7 PM on weekdays and 12 PM on weekends page [phone] Patient is a 89y old  Female who presents with a chief complaint of brought by family from urgent care due to N/V (2017 02:52)    SUBJECTIVE / OVERNIGHT EVENTS: No overnight events. No complaints this AM. ROS limited due to AO x 1 mental status, however no N/V/D/abdominal pain/F/C.     MEDICATIONS  (STANDING):  amLODIPine   Tablet 5 milliGRAM(s) Oral daily  apixaban 2.5 milliGRAM(s) Oral every 12 hours  atorvastatin 20 milliGRAM(s) Oral at bedtime  cefTRIAXone   IVPB 1 Gram(s) IV Intermittent every 24 hours  levothyroxine 25 MICROGram(s) Oral daily    MEDICATIONS  (PRN):    Vitals  T(C): 36.3 (17 @ 13:51), Max: 36.8 (17 @ 18:09)  HR: 58 (17 @ 13:51) (58 - 86)  BP: 115/57 (17 @ 13:51) (115/57 - 238/79)  RR: 18 (17 @ 13:51) (16 - 20)  SpO2: 99% (17 @ 13:51) (96% - 99%)    PHYSICAL EXAM  GENERAL: NAD, well-developed  NEURO: AO x1, PERRLA, EOMI,  HEAD:  Atraumatic, Normocephalic  EYES: conjunctiva and sclera clear  NECK: Supple, No JVD, no lymphadenopathy, no thyromegaly  CHEST/LUNG: Clear to auscultation bilaterally; No wheezes, rales or rhonchi  HEART: Regular rate and rhythm; 3/6cres decres murmur at 2nd IC space on L.  ABDOMEN: Soft, Nontender, Nondistended; Bowel sounds present, no masses.  EXTREMITIES:  2+ Peripheral Pulses, No clubbing, cyanosis, or edema  SKIN: Warm, dry, in tact, no rashes or lesions  PSYCH: affect appropriate, pleasant    LABS:                        10.6   5.7   )-----------( 90       ( 2017 07:14 )             32.9     -    141  |  103  |  30<H>  ----------------------------<  104<H>  4.3   |  26  |  1.34<H>    Ca    8.4      2017 07:13  Phos  4.0     11-  Mg     2.2     -    TPro  7.7  /  Alb  4.0  /  TBili  0.6  /  DBili  x   /  AST  20  /  ALT  18  /  AlkPhos  92  11-20    PT/INR - ( 2017 18:54 )   PT: 13.2 sec;   INR: 1.21 ratio         PTT - ( 2017 18:54 )  PTT:31.6 sec  CARDIAC MARKERS ( 2017 18:54 )  x     / <0.01 ng/mL / 28 U/L / x     / x          Urinalysis Basic - ( 2017 19:10 )    Color: PL Yellow / Appearance: SL Turbid / S.013 / pH: x  Gluc: x / Ketone: Negative  / Bili: Negative / Urobili: Negative   Blood: x / Protein: 100 mg/dL / Nitrite: Positive   Leuk Esterase: Large / RBC: 3-5 /HPF / WBC >50 /HPF   Sq Epi: x / Non Sq Epi: x / Bacteria: Many /HPF    James Christianson MD  Internal Medicine PGY-1  Pager: [phone]/ [phone]  After 7 PM on weekdays and 12 PM on weekends page [phone]

## 2017-11-21 NOTE — PROGRESS NOTE ADULT - PROBLEM SELECTOR PLAN 3
Still hypertensive to 170's/80's. Goal is to reduce 25-30% from her BP at presentation (systolic 230's).   -started on amlodipine.   -home meds clarified, d/w pharmacy.  - no end organ damage appreciated at this time Still hypertensive to 170's/80's this AM. Goal is to reduce 25-30% from her BP at presentation (systolic 230's).   -started on amlodipine  -hold home anti-htn as BP is at goal for the day  -home meds clarified, d/w pharmacy.  - no end organ damage appreciated at this time

## 2017-11-21 NOTE — H&P ADULT - NSHPLABSRESULTS_GEN_ALL_CORE
Personally reviewed available labs, imaging and ekg     Labs  CBC Full  -  ( 2017 18:54 )  WBC Count : 9.0 K/uL  Hemoglobin : 13.0 g/dL  Hematocrit : 39.6 %  Platelet Count - Automated : 106 K/uL  Mean Cell Volume : 96.9 fl  Mean Cell Hemoglobin : 31.8 pg  Mean Cell Hemoglobin Concentration : 32.8 gm/dL  Auto Neutrophil # : 8.2 K/uL  Auto Lymphocyte # : 0.4 K/uL  Auto Monocyte # : 0.3 K/uL  Auto Eosinophil # : 0.0 K/uL  Auto Basophil # : 0.0 K/uL  Auto Neutrophil % : 91.1 %  Auto Lymphocyte % : 4.3 %  Auto Monocyte % : 3.9 %  Auto Eosinophil % : 0.3 %  Auto Basophil % : 0.4 %    11    142  |  102  |  35<H>  ----------------------------<  150<H>  4.7   |  25  |  1.43<H>    Ca    9.2      2017 18:54    TPro  7.7  /  Alb  4.0  /  TBili  0.6  /  DBili  x   /  AST  20  /  ALT  18  /  AlkPhos  92  11-20    PT/INR - ( 2017 18:54 )   PT: 13.2 sec;   INR: 1.21 ratio         PTT - ( 2017 18:54 )  PTT:31.6 sec  Urinalysis Basic - ( 2017 19:10 )    Color: PL Yellow / Appearance: SL Turbid / S.013 / pH: x  Gluc: x / Ketone: Negative  / Bili: Negative / Urobili: Negative   Blood: x / Protein: 100 mg/dL / Nitrite: Positive   Leuk Esterase: Large / RBC: 3-5 /HPF / WBC >50 /HPF   Sq Epi: x / Non Sq Epi: x / Bacteria: Many /HPF      CAPILLARY BLOOD GLUCOSE        CARDIAC MARKERS ( 2017 18:54 )  x     / <0.01 ng/mL / 28 U/L / x     / x          18:54 - VBG - pH: 7.36  | pCO2: 53    | pO2: 32    | Lactate: 1.3          Imaging  < from: CT Abdomen and Pelvis w/ Oral Cont and w/ IV Cont (17 @ 21:05) >    FINDINGS:    LOWER CHEST: Bibasilar groundglass haziness with interlobular septal   thickening compatible with interstitial edema. Small bilateral pleural   effusions. Mild bibasilar subsegmental atelectasis, left greater than   right. Cardiomegaly. Coronary artery and aortic valve calcifications.  Bladder: Underdistended  urinary  bladder.  Mild  diffuse  haziness  surrounding  the  urinary  bladder  for  which  infection  cannot  be  excluded.  IMPRESSION:    No bowel obstruction.  Colonic diverticulosis without acute diverticulitis.  Diffuse haziness surrounding the urinary bladder for which infection   cannot be excluded. Clinical correlation is recommended.  Pulmonary interstitial edema and small bilateral pleural effusions.   Cardiomegaly.  Trace pelvic free fluid.      < end of copied text >    CXR: cardiomegaly, prominent reticulonodular opacification perihilar b/l  EKG: Aflutter w/ varriable conduction w/ rate of 87bpm Personally reviewed available labs, imaging and ekg     Labs  CBC Full  -  ( 2017 18:54 )  WBC Count : 9.0 K/uL  Hemoglobin : 13.0 g/dL  Hematocrit : 39.6 %  Platelet Count - Automated : 106 K/uL  Mean Cell Volume : 96.9 fl  Mean Cell Hemoglobin : 31.8 pg  Mean Cell Hemoglobin Concentration : 32.8 gm/dL  Auto Neutrophil # : 8.2 K/uL  Auto Lymphocyte # : 0.4 K/uL  Auto Monocyte # : 0.3 K/uL  Auto Eosinophil # : 0.0 K/uL  Auto Basophil # : 0.0 K/uL  Auto Neutrophil % : 91.1 %  Auto Lymphocyte % : 4.3 %  Auto Monocyte % : 3.9 %  Auto Eosinophil % : 0.3 %  Auto Basophil % : 0.4 %    11    142  |  102  |  35<H>  ----------------------------<  150<H>  4.7   |  25  |  1.43<H>    Ca    9.2      2017 18:54    TPro  7.7  /  Alb  4.0  /  TBili  0.6  /  DBili  x   /  AST  20  /  ALT  18  /  AlkPhos  92  11-20    PT/INR - ( 2017 18:54 )   PT: 13.2 sec;   INR: 1.21 ratio         PTT - ( 2017 18:54 )  PTT:31.6 sec  Urinalysis Basic - ( 2017 19:10 )    Color: PL Yellow / Appearance: SL Turbid / S.013 / pH: x  Gluc: x / Ketone: Negative  / Bili: Negative / Urobili: Negative   Blood: x / Protein: 100 mg/dL / Nitrite: Positive   Leuk Esterase: Large / RBC: 3-5 /HPF / WBC >50 /HPF   Sq Epi: x / Non Sq Epi: x / Bacteria: Many /HPF      CAPILLARY BLOOD GLUCOSE        CARDIAC MARKERS ( 2017 18:54 )  x     / <0.01 ng/mL / 28 U/L / x     / x          18:54 - VBG - pH: 7.36  | pCO2: 53    | pO2: 32    | Lactate: 1.3          Imaging  < from: CT Abdomen and Pelvis w/ Oral Cont and w/ IV Cont (17 @ 21:05) >    FINDINGS:    LOWER CHEST: Bibasilar groundglass haziness with interlobular septal   thickening compatible with interstitial edema. Small bilateral pleural   effusions. Mild bibasilar subsegmental atelectasis, left greater than   right. Cardiomegaly. Coronary artery and aortic valve calcifications.  Bladder: Underdistended  urinary  bladder.  Mild  diffuse  haziness  surrounding  the  urinary  bladder  for  which  infection  cannot  be  excluded.  IMPRESSION:    No bowel obstruction.  Colonic diverticulosis without acute diverticulitis.  Diffuse haziness surrounding the urinary bladder for which infection   cannot be excluded. Clinical correlation is recommended.  Pulmonary interstitial edema and small bilateral pleural effusions.   Cardiomegaly.  Trace pelvic free fluid.      < end of copied text >    CXR: cardiomegaly, prominent reticulonodular opacification perihilar b/l  < from: CT Head No Cont (17 @ 21:00) >      IMPRESSION:    Severe white matter microvascular ischemic changes and chronic infarcts.   Pre-existing ischemic disease limits evaluation for acute ischemia.  No acute intracranial hemorrhage or mass effect.    < end of copied text >      EKG: Aflutter w/ varriable conduction w/ rate of 87bpm

## 2017-11-21 NOTE — PROGRESS NOTE ADULT - PROBLEM SELECTOR PLAN 2
-ct head negative for bleed, mass, or midline shift however was significant for extensive and diffuse microvascular changes. As per family patient has suffered multiple strokes in past. Acute ischemic insult cannot be ruled out, however may also be due to hypertensive urgency vs. urinary infection. -ct head negative for bleed, mass, or midline shift however was significant for extensive and diffuse microvascular changes. As per family patient has suffered multiple strokes in past with resultant dementia. Acute ischemic insult cannot be ruled out, however may also be due to hypertensive urgency vs. urinary infection. Given improvement in mental status will hold off on MRI at this time  -c/w eliquis for a/c

## 2017-11-21 NOTE — H&P ADULT - PROBLEM SELECTOR PLAN 5
charted hx of heart failure. no signs on exam. no jvd, can lay flat w/o sob  - will need day team to f/u w/ family if they can be reached at that time afib w/ aflutter variable conduction at normal rate  - will need home med rec. c/w betablocker prn

## 2017-11-21 NOTE — DIETITIAN INITIAL EVALUATION ADULT. - PROBLEM SELECTOR PLAN 3
patient noted to have hypertensive urgency in the ED. responsive to labetalol 10mg IV in ED. unclear home meds, likely missed medications given n/v reported per chart. Will treat w/ labetalol IV prn  - no end organ damage appreciated at this time

## 2017-11-21 NOTE — H&P ADULT - PROBLEM SELECTOR PLAN 10
DVT ppx: patient is on apixiban BID-will need day team to continue to assess given Cr 1.4  AAspiration precaution, NPO except meds until not delirious, fall precautions  Day team will need day team to reattempt to reach the family

## 2017-11-22 ENCOUNTER — TRANSCRIPTION ENCOUNTER (OUTPATIENT)
Age: 82
End: 2017-11-22

## 2017-11-22 VITALS
SYSTOLIC BLOOD PRESSURE: 165 MMHG | OXYGEN SATURATION: 92 % | RESPIRATION RATE: 16 BRPM | HEART RATE: 84 BPM | TEMPERATURE: 98 F | DIASTOLIC BLOOD PRESSURE: 75 MMHG

## 2017-11-22 LAB
-  AMIKACIN: SIGNIFICANT CHANGE UP
-  AMPICILLIN/SULBACTAM: SIGNIFICANT CHANGE UP
-  AMPICILLIN: SIGNIFICANT CHANGE UP
-  AZTREONAM: SIGNIFICANT CHANGE UP
-  CEFAZOLIN: SIGNIFICANT CHANGE UP
-  CEFEPIME: SIGNIFICANT CHANGE UP
-  CEFOXITIN: SIGNIFICANT CHANGE UP
-  CEFTAZIDIME: SIGNIFICANT CHANGE UP
-  CEFTRIAXONE: SIGNIFICANT CHANGE UP
-  CIPROFLOXACIN: SIGNIFICANT CHANGE UP
-  ERTAPENEM: SIGNIFICANT CHANGE UP
-  GENTAMICIN: SIGNIFICANT CHANGE UP
-  IMIPENEM: SIGNIFICANT CHANGE UP
-  LEVOFLOXACIN: SIGNIFICANT CHANGE UP
-  MEROPENEM: SIGNIFICANT CHANGE UP
-  NITROFURANTOIN: SIGNIFICANT CHANGE UP
-  PIPERACILLIN/TAZOBACTAM: SIGNIFICANT CHANGE UP
-  TOBRAMYCIN: SIGNIFICANT CHANGE UP
-  TRIMETHOPRIM/SULFAMETHOXAZOLE: SIGNIFICANT CHANGE UP
APTT BLD: 26.4 SEC — LOW (ref 27.5–37.4)
CULTURE RESULTS: SIGNIFICANT CHANGE UP
INR BLD: 1.2 RATIO — HIGH (ref 0.88–1.16)
METHOD TYPE: SIGNIFICANT CHANGE UP
ORGANISM # SPEC MICROSCOPIC CNT: SIGNIFICANT CHANGE UP
ORGANISM # SPEC MICROSCOPIC CNT: SIGNIFICANT CHANGE UP
PROTHROM AB SERPL-ACNC: 13 SEC — HIGH (ref 9.8–12.7)
SPECIMEN SOURCE: SIGNIFICANT CHANGE UP

## 2017-11-22 PROCEDURE — 96376 TX/PRO/DX INJ SAME DRUG ADON: CPT

## 2017-11-22 PROCEDURE — 97162 PT EVAL MOD COMPLEX 30 MIN: CPT

## 2017-11-22 PROCEDURE — 85014 HEMATOCRIT: CPT

## 2017-11-22 PROCEDURE — 80053 COMPREHEN METABOLIC PANEL: CPT

## 2017-11-22 PROCEDURE — 81001 URINALYSIS AUTO W/SCOPE: CPT

## 2017-11-22 PROCEDURE — G8979: CPT

## 2017-11-22 PROCEDURE — 74177 CT ABD & PELVIS W/CONTRAST: CPT

## 2017-11-22 PROCEDURE — 87040 BLOOD CULTURE FOR BACTERIA: CPT

## 2017-11-22 PROCEDURE — 84295 ASSAY OF SERUM SODIUM: CPT

## 2017-11-22 PROCEDURE — 84100 ASSAY OF PHOSPHORUS: CPT

## 2017-11-22 PROCEDURE — 87186 SC STD MICRODIL/AGAR DIL: CPT

## 2017-11-22 PROCEDURE — 84132 ASSAY OF SERUM POTASSIUM: CPT

## 2017-11-22 PROCEDURE — 70450 CT HEAD/BRAIN W/O DYE: CPT

## 2017-11-22 PROCEDURE — G8978: CPT

## 2017-11-22 PROCEDURE — G8980: CPT

## 2017-11-22 PROCEDURE — 82550 ASSAY OF CK (CPK): CPT

## 2017-11-22 PROCEDURE — 85027 COMPLETE CBC AUTOMATED: CPT

## 2017-11-22 PROCEDURE — 83735 ASSAY OF MAGNESIUM: CPT

## 2017-11-22 PROCEDURE — 82947 ASSAY GLUCOSE BLOOD QUANT: CPT

## 2017-11-22 PROCEDURE — 82435 ASSAY OF BLOOD CHLORIDE: CPT

## 2017-11-22 PROCEDURE — 99285 EMERGENCY DEPT VISIT HI MDM: CPT | Mod: 25

## 2017-11-22 PROCEDURE — 93005 ELECTROCARDIOGRAM TRACING: CPT

## 2017-11-22 PROCEDURE — 84484 ASSAY OF TROPONIN QUANT: CPT

## 2017-11-22 PROCEDURE — 85730 THROMBOPLASTIN TIME PARTIAL: CPT

## 2017-11-22 PROCEDURE — 83605 ASSAY OF LACTIC ACID: CPT

## 2017-11-22 PROCEDURE — 96375 TX/PRO/DX INJ NEW DRUG ADDON: CPT

## 2017-11-22 PROCEDURE — 82330 ASSAY OF CALCIUM: CPT

## 2017-11-22 PROCEDURE — 71045 X-RAY EXAM CHEST 1 VIEW: CPT

## 2017-11-22 PROCEDURE — 82803 BLOOD GASES ANY COMBINATION: CPT

## 2017-11-22 PROCEDURE — 96374 THER/PROPH/DIAG INJ IV PUSH: CPT | Mod: XU

## 2017-11-22 PROCEDURE — 80048 BASIC METABOLIC PNL TOTAL CA: CPT

## 2017-11-22 PROCEDURE — 85610 PROTHROMBIN TIME: CPT

## 2017-11-22 PROCEDURE — 87086 URINE CULTURE/COLONY COUNT: CPT

## 2017-11-22 PROCEDURE — 83880 ASSAY OF NATRIURETIC PEPTIDE: CPT

## 2017-11-22 RX ORDER — FUROSEMIDE 40 MG
40 TABLET ORAL DAILY
Qty: 0 | Refills: 0 | Status: DISCONTINUED | OUTPATIENT
Start: 2017-11-22 | End: 2017-11-22

## 2017-11-22 RX ORDER — LOSARTAN POTASSIUM 100 MG/1
100 TABLET, FILM COATED ORAL DAILY
Qty: 0 | Refills: 0 | Status: DISCONTINUED | OUTPATIENT
Start: 2017-11-22 | End: 2017-11-22

## 2017-11-22 RX ORDER — METOPROLOL TARTRATE 50 MG
50 TABLET ORAL DAILY
Qty: 0 | Refills: 0 | Status: DISCONTINUED | OUTPATIENT
Start: 2017-11-22 | End: 2017-11-22

## 2017-11-22 RX ORDER — APIXABAN 2.5 MG/1
1 TABLET, FILM COATED ORAL
Qty: 0 | Refills: 0 | COMMUNITY
Start: 2017-11-22

## 2017-11-22 RX ADMIN — Medication 50 MILLIGRAM(S): at 13:47

## 2017-11-22 RX ADMIN — Medication 40 MILLIGRAM(S): at 13:47

## 2017-11-22 RX ADMIN — Medication 25 MICROGRAM(S): at 06:59

## 2017-11-22 RX ADMIN — LOSARTAN POTASSIUM 100 MILLIGRAM(S): 100 TABLET, FILM COATED ORAL at 13:47

## 2017-11-22 RX ADMIN — AMLODIPINE BESYLATE 5 MILLIGRAM(S): 2.5 TABLET ORAL at 06:57

## 2017-11-22 RX ADMIN — CEFTRIAXONE 100 GRAM(S): 500 INJECTION, POWDER, FOR SOLUTION INTRAMUSCULAR; INTRAVENOUS at 16:00

## 2017-11-22 RX ADMIN — APIXABAN 2.5 MILLIGRAM(S): 2.5 TABLET, FILM COATED ORAL at 06:57

## 2017-11-22 RX ADMIN — APIXABAN 2.5 MILLIGRAM(S): 2.5 TABLET, FILM COATED ORAL at 16:02

## 2017-11-22 NOTE — PROGRESS NOTE ADULT - PROBLEM SELECTOR PLAN 4
Likely JOHN on CKD vs CKD, Cr pending today -Likely CKD creatinine stable yesterday  -urinating normally  -bmp next week with pmd

## 2017-11-22 NOTE — DISCHARGE NOTE ADULT - CARE PLAN
Principal Discharge DX:	UTI (urinary tract infection)  Goal:	resolution of symptoms and prevention of further episodes  Instructions for follow-up, activity and diet:	A urinary tract infection (UTI) is caused by bacteria that get inside your urinary tract. Most bacteria that enter your urinary tract come out when you urinate. If the bacteria stay in your urinary tract, you may get an infection. Your urinary tract includes your kidneys, ureters, bladder, and urethra. Urine is made in your kidneys, and it flows from the ureters to the bladder. Urine leaves the bladder through the urethra. A UTI is more common in your lower urinary tract, which includes your bladder and urethra. You were treated with 3 days of antibiotics, and your symptoms of nausea and vomiting resolved.     To prevent another UTI, you can do the following:  Empty your bladder often.   Wipe from front to back after you urinate or have a bowel movement. This will help prevent germs from getting into your urinary tract.  Drink water, but do not drink alcohol, caffeine, or citrus juices. These can irritate your bladder and increase your symptoms.  Do not douche or use feminine deodorants. These can change the chemical balance in your vagina.  Do pelvic muscle exercises often. Pelvic muscle exercises may help you start and stop urinating. Strong pelvic muscles may help you empty your bladder easier. Squeeze these muscles tightly for 5 seconds like you are trying to hold back urine. Then relax for 5 seconds. Gradually work up to squeezing for 10 seconds. Do 3 sets of 15 repetitions a day    Seek care immediately if:  •You are urinating very little or not at all.  •You have a high fever with shaking chills.  •You have side or back pain that gets worse.  Secondary Diagnosis:	Hypertensive urgency  Instructions for follow-up, activity and diet:	You have hypertension. This means the force of blood against your artery walls is too strong. It also means your heart is working hard to move blood. High blood pressure usually has no symptoms, but over time, it can damage your heart, blood vessels, eyes, kidneys, and other organs. With help from your doctor, you can manage your blood pressure and protect your health.     Take your blood pressure medicine exactly as directed. Don't skip doses. Missing doses can cause your blood pressure to get out of control. If you do miss a dose (or doses) check with your healthcare provider about what to do. Avoid medicine that contain heart stimulants, including over-the-counter drugs. Check for warnings about high blood pressure on the label. Ask the pharmacist before purchasing something you haven't used before. Check with your doctor or pharmacist before taking a decongestant. Some decongestants can worsen high blood pressure.    If you experience headache, nausea, vomiting, blurry vision,weakness, or passing out, please seek immediate medical attention as these could be signs that your blood pressure is dangerously high.

## 2017-11-22 NOTE — DISCHARGE NOTE ADULT - PLAN OF CARE
resolution of symptoms and prevention of further episodes A urinary tract infection (UTI) is caused by bacteria that get inside your urinary tract. Most bacteria that enter your urinary tract come out when you urinate. If the bacteria stay in your urinary tract, you may get an infection. Your urinary tract includes your kidneys, ureters, bladder, and urethra. Urine is made in your kidneys, and it flows from the ureters to the bladder. Urine leaves the bladder through the urethra. A UTI is more common in your lower urinary tract, which includes your bladder and urethra. You were treated with 3 days of antibiotics, and your symptoms of nausea and vomiting resolved.     To prevent another UTI, you can do the following:  Empty your bladder often.   Wipe from front to back after you urinate or have a bowel movement. This will help prevent germs from getting into your urinary tract.  Drink water, but do not drink alcohol, caffeine, or citrus juices. These can irritate your bladder and increase your symptoms.  Do not douche or use feminine deodorants. These can change the chemical balance in your vagina.  Do pelvic muscle exercises often. Pelvic muscle exercises may help you start and stop urinating. Strong pelvic muscles may help you empty your bladder easier. Squeeze these muscles tightly for 5 seconds like you are trying to hold back urine. Then relax for 5 seconds. Gradually work up to squeezing for 10 seconds. Do 3 sets of 15 repetitions a day    Seek care immediately if:  •You are urinating very little or not at all.  •You have a high fever with shaking chills.  •You have side or back pain that gets worse. You have hypertension. This means the force of blood against your artery walls is too strong. It also means your heart is working hard to move blood. High blood pressure usually has no symptoms, but over time, it can damage your heart, blood vessels, eyes, kidneys, and other organs. With help from your doctor, you can manage your blood pressure and protect your health.     Take your blood pressure medicine exactly as directed. Don't skip doses. Missing doses can cause your blood pressure to get out of control. If you do miss a dose (or doses) check with your healthcare provider about what to do. Avoid medicine that contain heart stimulants, including over-the-counter drugs. Check for warnings about high blood pressure on the label. Ask the pharmacist before purchasing something you haven't used before. Check with your doctor or pharmacist before taking a decongestant. Some decongestants can worsen high blood pressure.    If you experience headache, nausea, vomiting, blurry vision,weakness, or passing out, please seek immediate medical attention as these could be signs that your blood pressure is dangerously high.

## 2017-11-22 NOTE — DISCHARGE NOTE ADULT - MEDICATION SUMMARY - MEDICATIONS TO TAKE
I will START or STAY ON the medications listed below when I get home from the hospital:    losartan 100 mg oral tablet  -- 1 tab(s) by mouth once a day  -- Indication: For Hypertension    apixaban 2.5 mg oral tablet  -- 1 tab(s) by mouth every 12 hours  -- Indication: For Antcoagulation    sertraline 50 mg oral tablet  -- 1 tab(s) by mouth once a day  -- Indication: For Depression    atorvastatin 20 mg oral tablet  -- 1 tab(s) by mouth once a day  -- Indication: For Hyperlipidemia    Metoprolol Succinate ER 50 mg oral tablet, extended release  -- 1 tab(s) by mouth once a day  -- Indication: For Heart failure    triamcinolone 0.1% topical ointment  -- Apply on skin to affected area 2 times a day (buttocks)  -- Indication: For rash    nystatin 100,000 units/g topical ointment  -- Apply on skin to affected area 2 times a day (buttocks)  -- Indication: For rash    furosemide 40 mg oral tablet  -- 1-2 tab(s) by mouth once a day as directed  -- Indication: For Heart failure    potassium chloride 10 mEq oral tablet, extended release  -- 1 tab(s) by mouth once a day  -- Indication: For supplement    levothyroxine 25 mcg (0.025 mg) oral tablet  -- 1 tab(s) by mouth once a day  -- Indication: For Hypothyroidism

## 2017-11-22 NOTE — DISCHARGE NOTE ADULT - MEDICATION SUMMARY - MEDICATIONS TO CHANGE
I will SWITCH the dose or number of times a day I take the medications listed below when I get home from the hospital:    Eliquis

## 2017-11-22 NOTE — PHYSICAL THERAPY INITIAL EVALUATION ADULT - LIVES WITH, PROFILE
Pt lives at home with 24/7 aide.  Home has ramp entrance, with bedroom on first floor.  PTA pt mobilized via wheelchair, not ambulating in approx 1 year however able to participate n transferring to  or commode./alone

## 2017-11-22 NOTE — PROGRESS NOTE ADULT - PROBLEM SELECTOR PLAN 3
BP labile. This AM  range 129- 181/57-63 over last 24 hours.  -Goal is to reduce 25-30% from her BP at presentation (systolic 230's).   -started on amlodipine 5 mg, however with conservative hold parameter, systolic 140  -hold home anti-htn agents currently  -home meds clarified, d/w pharmacy.  - no end organ damage appreciated at this time BP labile. This AM  range 129- 181/57-63 over last 24 hours.  -Goal is to reduce 25% per day from her BP at presentation (systolic 230's).   -verified home meds with family and pharmacy  --started on amlodipine 5 mg yesterday, with conservative hold parameters however will discontinue and restart home CHF meds including metoprolol, losartan, and lasix  - no end organ damage appreciated at this time

## 2017-11-22 NOTE — PHYSICAL THERAPY INITIAL EVALUATION ADULT - PERTINENT HX OF CURRENT PROBLEM, REHAB EVAL
89 yo F w/ hx HTN afib HLD CAD CKD ITP and aortic stenosis-- sent here from urgent care or N/V + positive UA, found to have /769, now being treated with IV antibiotics, BP improving.  c/b metabolic encephalopathy

## 2017-11-22 NOTE — PROGRESS NOTE ADULT - SUBJECTIVE AND OBJECTIVE BOX
Patient is a 89y old  Female who presents with a chief complaint of brought by family from urgent care due to N/V (2017 02:52)      SUBJECTIVE / OVERNIGHT EVENTS: No overnight events. No complaints this AM. Patient denies CP, SOB.    MEDICATIONS  (STANDING):  amLODIPine   Tablet 5 milliGRAM(s) Oral daily  apixaban 2.5 milliGRAM(s) Oral every 12 hours  atorvastatin 20 milliGRAM(s) Oral at bedtime  cefTRIAXone   IVPB 1 Gram(s) IV Intermittent every 24 hours  levothyroxine 25 MICROGram(s) Oral daily    Vitals  T(C): 36.8 (17 @ 04:31), Max: 36.9 (17 @ 22:46)  HR: 72 (17 @ 04:31) (58 - 77)  BP: 129/70 (17 @ 04:31) (115/57 - 181/63)  RR: 18 (17 @ 04:31) (18 - 20)  SpO2: 95% (17 @ 22:46) (95% - 99%)    PHYSICAL EXAM  GENERAL: NAD, well-developed  NEURO: AO x1, PERRLA, EOMI,  HEAD:  Atraumatic, Normocephalic  EYES: conjunctiva and sclera clear  NECK: Supple, No JVD, no lymphadenopathy, no thyromegaly  CHEST/LUNG: Clear to auscultation bilaterally; No wheezes, rales or rhonchi  HEART: Regular rate and rhythm; 3/6cres decres murmur at 2nd IC space on L.  ABDOMEN: Soft, Nontender, Nondistended; Bowel sounds present, no masses.  EXTREMITIES:  2+ Peripheral Pulses, No clubbing, cyanosis, or edema  SKIN: Warm, dry, in tact, no rashes or lesions  PSYCH: affect appropriate, pleasant    LABS:                        10.6   5.7   )-----------( 90       ( 2017 07:14 )             32.9     11-    141  |  103  |  30<H>  ----------------------------<  104<H>  4.3   |  26  |  1.34<H>    Ca    8.4      2017 07:13  Phos  4.0     11-  Mg     2.2     11-    TPro  7.7  /  Alb  4.0  /  TBili  0.6  /  DBili  x   /  AST  20  /  ALT  18  /  AlkPhos  92  11-20    PT/INR - ( 2017 06:17 )   PT: 13.0 sec;   INR: 1.20 ratio    PTT - ( 2017 06:17 )  PTT:26.4 sec  CARDIAC MARKERS ( 2017 18:54 )  x     / <0.01 ng/mL / 28 U/L / x     / x        Urinalysis Basic - ( 2017 19:10 )    Color: PL Yellow / Appearance: SL Turbid / S.013 / pH: x  Gluc: x / Ketone: Negative  / Bili: Negative / Urobili: Negative   Blood: x / Protein: 100 mg/dL / Nitrite: Positive   Leuk Esterase: Large / RBC: 3-5 /HPF / WBC >50 /HPF   Sq Epi: x / Non Sq Epi: x / Bacteria: Many /HPF    James Christianson MD  Internal Medicine PGY-1  Pager: [phone]/ [facility] [phone]  After 7 PM on weekdays and 12 PM on weekends page [phone] Patient is a 89y old  Female who presents with a chief complaint of brought by family from urgent care due to N/V (2017 02:52)      SUBJECTIVE / OVERNIGHT EVENTS: No overnight events. No complaints this AM. Patient denies CP, SOB. No dysuria or fevers. More awake and alert. Daughter asking to take her home today.    MEDICATIONS  (STANDING):  amLODIPine   Tablet 5 milliGRAM(s) Oral daily  apixaban 2.5 milliGRAM(s) Oral every 12 hours  atorvastatin 20 milliGRAM(s) Oral at bedtime  cefTRIAXone   IVPB 1 Gram(s) IV Intermittent every 24 hours  levothyroxine 25 MICROGram(s) Oral daily    Vitals  T(C): 36.8 (17 @ 04:31), Max: 36.9 (17 @ 22:46)  HR: 72 (17 @ 04:31) (58 - 77)  BP: 129/70 (17 @ 04:31) (115/57 - 181/63)  RR: 18 (17 @ 04:31) (18 - 20)  SpO2: 95% (17 @ 22:46) (95% - 99%)    PHYSICAL EXAM  GENERAL: NAD, well-developed, more awake then yesterday  NEURO: AO x2 PERRLA, EOMI,  HEAD:  Atraumatic, Normocephalic  EYES: conjunctiva and sclera clear  NECK: Supple, No JVD, no lymphadenopathy, no thyromegaly  CHEST/LUNG: Clear to auscultation bilaterally; No wheezes, rales or rhonchi  HEART: Regular rate and rhythm; 3/6cres decres murmur at 2nd IC space on L.  ABDOMEN: Soft, Nontender, Nondistended; Bowel sounds present, no masses.  EXTREMITIES:  2+ Peripheral Pulses, No clubbing, cyanosis, or edema  SKIN: Warm, dry, in tact, no rashes or lesions  PSYCH: affect appropriate, pleasant    LABS:                        10.6   5.7   )-----------( 90       ( 2017 07:14 )             32.9     -    141  |  103  |  30<H>  ----------------------------<  104<H>  4.3   |  26  |  1.34<H>    Ca    8.4      2017 07:13  Phos  4.0     11-  Mg     2.2     -    TPro  7.7  /  Alb  4.0  /  TBili  0.6  /  DBili  x   /  AST  20  /  ALT  18  /  AlkPhos  92  11-20    PT/INR - ( 2017 06:17 )   PT: 13.0 sec;   INR: 1.20 ratio    PTT - ( 2017 06:17 )  PTT:26.4 sec  CARDIAC MARKERS ( 2017 18:54 )  x     / <0.01 ng/mL / 28 U/L / x     / x        Urinalysis Basic - ( 2017 19:10 )    Color: PL Yellow / Appearance: SL Turbid / S.013 / pH: x  Gluc: x / Ketone: Negative  / Bili: Negative / Urobili: Negative   Blood: x / Protein: 100 mg/dL / Nitrite: Positive   Leuk Esterase: Large / RBC: 3-5 /HPF / WBC >50 /HPF   Sq Epi: x / Non Sq Epi: x / Bacteria: Many /HPF    James Christianson MD  Internal Medicine PGY-1  Pager: [phone]/ [facility] [phone]  After 7 PM on weekdays and 12 PM on weekends page [phone]

## 2017-11-22 NOTE — DISCHARGE NOTE ADULT - PATIENT PORTAL LINK FT
“You can access the [facility] Patient Portal, offered by [facility], by registering with the following website: [email or web address]”

## 2017-11-22 NOTE — PHYSICAL THERAPY INITIAL EVALUATION ADULT - ADDITIONAL COMMENTS
11/21 CT Abdomen/pelvis: No bowel obstruction. Colonic diverticulosis without acute diverticulitis. Diffuse haziness surrounding the urinary bladder for which infection cannot be excluded. Clinical correlation is recommended. Pulmonary interstitial edema and small bilateral pleural effusions. Cardiomegaly. Trace pelvic free fluid. 11/20 XR chest: Severe white matter microvascular ischemic changes and chronic infarcts.  Pre-existing ischemic disease limits evaluation for acute ischemia. No acute intracranial hemorrhage or mass effect.

## 2017-11-22 NOTE — PROGRESS NOTE ADULT - ASSESSMENT
89F of HTN, Afib, HLD, CAD s/p stent 2013, CKD, ITP (s/p bone marrow biopsy), and aortic stenosis was sent to [facility] from urgent care for n/v, now admitted  for UTI and hypertensive urgency. 89F of HTN, Afib, HLD, CAD s/p stent 2013, CKD, ITP (s/p bone marrow biopsy), and aortic stenosis was sent to [facility] from urgent care for n/v, now admitted  for metabolic encephalopathy due to UTI and hypertensive urgency.

## 2017-11-22 NOTE — PROGRESS NOTE ADULT - PROBLEM SELECTOR PLAN 1
-does not meet sepsis criteria  -given nausea and vomiting prior to presentation, and grossly positive UA will classify as symptomatic UTI and continue treatment with IV ceftriaxone, today day 3.   -urine cx shows E. Coli, sensitivities P. -does not meet sepsis criteria  -given nausea and vomiting prior to presentation, and grossly positive UA will classify as symptomatic UTI and continue treatment with IV ceftriaxone, today day 3.   -urine cx shows E. Coli, sensitivities Pending

## 2017-11-22 NOTE — PROGRESS NOTE ADULT - ATTENDING COMMENTS
more awake alert per family, now a and o x2 but not fully back to baseline. Denies any pain, no nausea and vomiting. Exam with no acute findings    AMS with underlying advanced dementia due microvascular disease likely due to UTI vs hypertensive urgency: mental status improving per family, hold sedative meds, treat UTI  -CT with no acute findings however limited due to extensive hx of microvascular disease. Given improvement in mental status at this time, acute CVA unlikely however if no further improvement or worsening will  consider MRI of brain  however no focal deficiets on exam to suggest so at this time    UTI: c/w ceftriaxone 1 gram daily, f/u urine cx to tailor antibiotics, monitor vitals    Hypertensive Urgency: SBP of 205-230's on presentation, gradually lower BP no greater then 25% in first 24 hours to avoid hypoperfusion. Home medss were not known overnight but patient only got amlodipine 5mg this AM with SBP in 110-120 this afternoon. Offered IVF to family to raise BP however family refused due to concern for fluid overload given extensive CHF history.   -c/w monitor BP, minimize BP reductions  -hold home lasix, metoprolol, and losartan at this time    pt lona
Daughter requesting to take patient home tonight after final dose of antibiotics.     Encephalopathy/AMS improved now back to baseline likely due to UTI and HTN urgency which have both improved    UTI: urine cx growing Ecoli but sensitives aren't back yet, due for 3rd dose of ceftriaxone today however significant clinical and mental improvement, HD stable, afebrile, no leukocytosis, bcx ngtd    Hypertensive urgency with hx of HFrEF: BP labile but improved since presentation, Per daughter BP is very labile at home as well. minimal bibasliar crackles consistent with small b/l pleural effusions on CT. Otherwise euvolemic  -will d/c amlodipine  -restart home meds including metoprolol, losartan, and lasix 40mg po daily  -BP check 3-5 days with pmd or cardiologist    outpatient cards and pmd follow up    discharge home today per family daughter request, however if urine cx sensitives shows resistance (unlikely), will hold discharge   discharge time 40 mins

## 2017-11-22 NOTE — DISCHARGE NOTE ADULT - OTHER SIGNIFICANT FINDINGS
PROCEDURE:   CT of the Abdomen and Pelvis was performed with intravenous contrast.   Intravenous contrast: 90 ml Omnipaque 350. 10 ml discarded.  Oral contrast: positive contrast was administered.  Sagittal and coronal reformats were performed.    FINDINGS:    LOWER CHEST: Bibasilar groundglass haziness with interlobular septal   thickening compatible with interstitial edema. Small bilateral pleural   effusions. Mild bibasilar subsegmental atelectasis, left greater than   right. Cardiomegaly. Coronary artery and aortic valve calcifications.    LIVER: Within normal limits.  BILE DUCTS: Intrahepatic and extrahepatic biliary dilatation, nonspecific   given age and post cholecystectomy state. Common bile duct measures up to   15 mm, tapering distally to 8 mm  GALLBLADDER: Cholecystectomy.  SPLEEN: Subcentimeter hypodensity, too small to characterize.  PANCREAS: Parenchymal atrophy. Mild nonspecific pancreatic ductal   dilatation measuring up  ADRENALS: Mild nonspecific left adrenal gland thickening. Unremarkable   right adrenal gland.    KIDNEYS/URETERS: Bilateral cortical renal atrophy. Small bilateral renal   hypodensities some of which may represent cysts while others are too   small to characterize.  1.4 cm left upper pole simple cyst. A 1 cm   hypodense lesion in the left lower pole measuring higher than simple   fluid (77 HU) and may represent a proteinaceous or hemorrhagic cyst.   No   hydroureteronephrosis or radiopaque stones.     BLADDER: Underdistended urinary bladder. Mild diffuse haziness   surrounding the urinary bladder for which infection cannot be excluded.  REPRODUCTIVE ORGANS: The uterus and adnexa are within normal limits.    BOWEL/MESENTERY: Colonic diverticulosis without acute diverticulitis. No   bowel obstruction. Normal appendix.  PERITONEUM/RETROPERITONEUM: Trace pelvic ascites. No free air or fluid   collection.  VESSELS:  Calcific atherosclerosis  LYMPH NODES: No lymphadenopathy.    SOFT TISSUES: Within normal limits.  BONES: Degenerative changes of the visualized spine. Generalized   osteopenia.    IMPRESSION:    No bowel obstruction.  Colonic diverticulosis without acute diverticulitis.  Diffuse haziness surrounding the urinary bladder for which infection   cannot be excluded. Clinical correlation is recommended.  Pulmonary interstitial edema and small bilateral pleural effusions.   Cardiomegaly.  Trace pelvic free fluid.

## 2017-11-22 NOTE — PROGRESS NOTE ADULT - PROBLEM SELECTOR PLAN 5
EKG c/w afib. Currently rate controlled.  c/w eliquis for a/c  hold metoprolol given relative hypotension since admission

## 2017-11-22 NOTE — DISCHARGE NOTE ADULT - PROVIDER TOKENS
FREE:[LAST:[Jarad],FIRST:[iDxon],PHONE:[phone]],FAX:[(   )    -],ADDRESS:[Address: [address] [place], [place] [address], [place] [address]]]

## 2017-11-22 NOTE — PROGRESS NOTE ADULT - PROBLEM SELECTOR PLAN 2
-ct head negative for bleed, mass, or midline shift however was significant for extensive and diffuse microvascular changes. As per family patient has suffered multiple strokes in past with resultant dementia. Acute ischemic insult cannot be ruled out, however may also be due to hypertensive urgency vs. urinary infection. Given improvement in mental status will hold off on MRI at this time. Currently back to baseline.  -c/w eliquis for a/c Improved, back to baseline per family  -ct head negative for bleed, mass, or midline shift however was significant for extensive and diffuse microvascular changes. As per family patient has suffered multiple strokes in past with resultant dementia. Acute ischemic insult cannot be ruled out, however may also be due to hypertensive urgency vs. urinary infection. -Given improvement in mental status will hold off on MRI at this time. Currently back to baseline.

## 2017-11-22 NOTE — DISCHARGE NOTE ADULT - CARE PROVIDER_API CALL
Dixon Abraham  Address: [address] [place] [address] [address], [place] [address]  Phone: [phone]  Fax: (       -

## 2017-11-22 NOTE — PHYSICAL THERAPY INITIAL EVALUATION ADULT - IMPAIRMENTS CONTRIBUTING TO GAIT DEVIATIONS, PT EVAL
impaired postural control/impaired coordination/impaired motor control/decreased strength/impaired balance/cognition

## 2017-11-22 NOTE — DISCHARGE NOTE ADULT - ADDITIONAL INSTRUCTIONS
Please follow up with Dr. Abraham regarding your medications to help control your blood pressure. You are discharged on the same medications you take at home.

## 2017-11-22 NOTE — DISCHARGE NOTE ADULT - NSFTFHOME1RD_GEN_ALL_CORE
Patient is coming in at 4:15 pm to see Dr. Sheppard for a sore throat.  Please send order to lab.   Fall risk

## 2017-11-22 NOTE — PROGRESS NOTE ADULT - PROBLEM SELECTOR PLAN 6
-severe systolic HF as per family  -followed by Dr. Abraham (cardiologist-- [place])  -hold metoprolol, lasix, losartan given dramatic change in BP since presentation, not fluid overloaded on exam -severe systolic HF as per family. On exam minimal crackles consistent with small b/l pleural effusion seen on CT but euvolemic otherwise  -followed by Dr. Abraham (cardiologist-- [place])  -restart metoprolol, lasix, losartan today home doses as meds were confirmed with family and pharmacy

## 2017-11-22 NOTE — PROGRESS NOTE ADULT - PROBLEM SELECTOR PLAN 10
DVT ppx: patient is on apixiban TID as outpatient- unclear what the indication for this would be. Started here on eliquis BID.   Aspiration precaution,, fall precautions  Diet- soft, sodium restricted.    James Christianson MD  Internal Medicine PGY-1  Pager: [phone]/ [facility] [phone]  After 7 PM on weekdays and 12 PM on weekends page [phone] DVT ppx: patient is on apixiban TID as outpatient- unclear what the indication for this would be. c/w eliquis 25mg BID.   Aspiration precaution,, fall precautions  Diet- soft, sodium restricted.    James Christianson MD  Internal Medicine PGY-1  Pager: [phone]/ [facility] [phone]  After 7 PM on weekdays and 12 PM on weekends page [phone]

## 2017-11-22 NOTE — DISCHARGE NOTE ADULT - HOME CARE AGENCY
[facility] [phone] Visiting nurse, physical therapy. Nursing services to begin 11/24/2017

## 2017-11-26 LAB
CULTURE RESULTS: SIGNIFICANT CHANGE UP
CULTURE RESULTS: SIGNIFICANT CHANGE UP
SPECIMEN SOURCE: SIGNIFICANT CHANGE UP
SPECIMEN SOURCE: SIGNIFICANT CHANGE UP

## 2017-11-28 RX ORDER — APIXABAN 2.5 MG/1
0 TABLET, FILM COATED ORAL
Qty: 0 | Refills: 0 | COMMUNITY

## 2017-11-28 RX ORDER — APIXABAN 2.5 MG/1
2 TABLET, FILM COATED ORAL
Qty: 0 | Refills: 0 | COMMUNITY

## 2017-11-28 RX ORDER — LEVOTHYROXINE SODIUM 125 MCG
0 TABLET ORAL
Qty: 0 | Refills: 0 | COMMUNITY

## 2017-11-28 RX ORDER — FUROSEMIDE 40 MG
0 TABLET ORAL
Qty: 0 | Refills: 0 | COMMUNITY

## 2017-11-28 RX ORDER — LEVOTHYROXINE SODIUM 125 MCG
1 TABLET ORAL
Qty: 0 | Refills: 0 | COMMUNITY

## 2017-11-28 RX ORDER — SERTRALINE 25 MG/1
0 TABLET, FILM COATED ORAL
Qty: 0 | Refills: 0 | COMMUNITY

## 2018-02-01 PROBLEM — I25.10 ATHEROSCLEROTIC HEART DISEASE OF NATIVE CORONARY ARTERY WITHOUT ANGINA PECTORIS: Chronic | Status: ACTIVE | Noted: 2017-11-20

## 2018-02-01 PROBLEM — E78.5 HYPERLIPIDEMIA, UNSPECIFIED: Chronic | Status: ACTIVE | Noted: 2017-11-20

## 2018-02-01 PROBLEM — I48.91 UNSPECIFIED ATRIAL FIBRILLATION: Chronic | Status: ACTIVE | Noted: 2017-11-20

## 2018-02-01 PROBLEM — I50.9 HEART FAILURE, UNSPECIFIED: Chronic | Status: ACTIVE | Noted: 2017-11-20

## 2018-02-01 PROBLEM — I10 ESSENTIAL (PRIMARY) HYPERTENSION: Chronic | Status: ACTIVE | Noted: 2017-11-20

## 2018-02-01 PROBLEM — D69.6 THROMBOCYTOPENIA, UNSPECIFIED: Chronic | Status: ACTIVE | Noted: 2017-11-20

## 2018-02-01 PROBLEM — I35.0 NONRHEUMATIC AORTIC (VALVE) STENOSIS: Chronic | Status: ACTIVE | Noted: 2017-11-20

## 2018-02-01 PROBLEM — N18.9 CHRONIC KIDNEY DISEASE, UNSPECIFIED: Chronic | Status: ACTIVE | Noted: 2017-11-20

## 2018-02-09 ENCOUNTER — APPOINTMENT (OUTPATIENT)
Dept: HOME HEALTH SERVICES | Facility: HOME HEALTH | Age: 83
End: 2018-02-09
Payer: MEDICARE

## 2018-02-09 VITALS
SYSTOLIC BLOOD PRESSURE: 110 MMHG | OXYGEN SATURATION: 96 % | WEIGHT: 110 LBS | HEART RATE: 58 BPM | RESPIRATION RATE: 16 BRPM | BODY MASS INDEX: 20.12 KG/M2 | DIASTOLIC BLOOD PRESSURE: 70 MMHG

## 2018-02-09 DIAGNOSIS — F32.9 MAJOR DEPRESSIVE DISORDER, SINGLE EPISODE, UNSPECIFIED: ICD-10-CM

## 2018-02-09 DIAGNOSIS — I63.9 CEREBRAL INFARCTION, UNSPECIFIED: ICD-10-CM

## 2018-02-09 DIAGNOSIS — S31.829A UNSPECIFIED OPEN WOUND OF LEFT BUTTOCK, INITIAL ENCOUNTER: ICD-10-CM

## 2018-02-09 DIAGNOSIS — R26.81 UNSTEADINESS ON FEET: ICD-10-CM

## 2018-02-09 DIAGNOSIS — I08.0 RHEUMATIC DISORDERS OF BOTH MITRAL AND AORTIC VALVES: ICD-10-CM

## 2018-02-09 DIAGNOSIS — L89.302 PRESSURE ULCER OF UNSPECIFIED BUTTOCK, STAGE 2: ICD-10-CM

## 2018-02-09 DIAGNOSIS — S31.819A UNSPECIFIED OPEN WOUND OF RIGHT BUTTOCK, INITIAL ENCOUNTER: ICD-10-CM

## 2018-02-09 PROCEDURE — 99345 HOME/RES VST NEW HIGH MDM 75: CPT | Mod: 25

## 2018-02-09 PROCEDURE — G0506: CPT

## 2018-02-09 RX ORDER — NITROFURANTOIN MACROCRYSTALS 50 MG/1
50 CAPSULE ORAL
Qty: 6 | Refills: 0 | Status: COMPLETED | COMMUNITY
Start: 2016-11-03 | End: 2018-02-09

## 2018-02-09 RX ORDER — NYSTATIN 100000 U/G
100000 OINTMENT TOPICAL
Qty: 15 | Refills: 0 | Status: COMPLETED | COMMUNITY
Start: 2017-09-27

## 2018-02-09 RX ORDER — TRIAMCINOLONE ACETONIDE 1 MG/G
0.1 OINTMENT TOPICAL
Qty: 15 | Refills: 0 | Status: COMPLETED | COMMUNITY
Start: 2017-09-27

## 2018-03-13 ENCOUNTER — APPOINTMENT (OUTPATIENT)
Dept: OPHTHALMOLOGY | Facility: CLINIC | Age: 83
End: 2018-03-13
Payer: MEDICARE

## 2018-03-13 PROCEDURE — 92226: CPT | Mod: RT

## 2018-03-13 PROCEDURE — 92014 COMPRE OPH EXAM EST PT 1/>: CPT

## 2018-04-12 ENCOUNTER — APPOINTMENT (OUTPATIENT)
Dept: HOME HEALTH SERVICES | Facility: HOME HEALTH | Age: 83
End: 2018-04-12
Payer: MEDICARE

## 2018-04-12 VITALS
RESPIRATION RATE: 16 BRPM | OXYGEN SATURATION: 93 % | DIASTOLIC BLOOD PRESSURE: 70 MMHG | SYSTOLIC BLOOD PRESSURE: 140 MMHG | HEART RATE: 63 BPM

## 2018-04-12 DIAGNOSIS — B37.2 CANDIDIASIS OF SKIN AND NAIL: ICD-10-CM

## 2018-04-12 DIAGNOSIS — I35.0 NONRHEUMATIC AORTIC (VALVE) STENOSIS: ICD-10-CM

## 2018-04-12 DIAGNOSIS — E78.00 PURE HYPERCHOLESTEROLEMIA, UNSPECIFIED: ICD-10-CM

## 2018-04-12 DIAGNOSIS — L89.301 PRESSURE ULCER OF UNSPECIFIED BUTTOCK, STAGE 1: ICD-10-CM

## 2018-04-12 PROCEDURE — 99350 HOME/RES VST EST HIGH MDM 60: CPT

## 2018-04-16 ENCOUNTER — LABORATORY RESULT (OUTPATIENT)
Age: 83
End: 2018-04-16

## 2018-04-17 LAB
ALBUMIN SERPL ELPH-MCNC: 3.6 G/DL
ALP BLD-CCNC: 107 U/L
ALT SERPL-CCNC: 36 U/L
ANION GAP SERPL CALC-SCNC: 14 MMOL/L
AST SERPL-CCNC: 33 U/L
BASOPHILS # BLD AUTO: 0.07 K/UL
BASOPHILS NFR BLD AUTO: 0.9 %
BILIRUB SERPL-MCNC: 0.4 MG/DL
BUN SERPL-MCNC: 55 MG/DL
CALCIUM SERPL-MCNC: 8.8 MG/DL
CHLORIDE SERPL-SCNC: 105 MMOL/L
CO2 SERPL-SCNC: 23 MMOL/L
CREAT SERPL-MCNC: 1.52 MG/DL
EOSINOPHIL # BLD AUTO: 0.2 K/UL
EOSINOPHIL NFR BLD AUTO: 2.6 %
HCT VFR BLD CALC: 32.8 %
HGB BLD-MCNC: 10.2 G/DL
LYMPHOCYTES # BLD AUTO: 0.2 K/UL
LYMPHOCYTES NFR BLD AUTO: 2.6 %
MAN DIFF?: NORMAL
MCHC RBC-ENTMCNC: 28.2 PG
MCHC RBC-ENTMCNC: 31.1 GM/DL
MCV RBC AUTO: 90.6 FL
MONOCYTES # BLD AUTO: 0.39 K/UL
MONOCYTES NFR BLD AUTO: 5.2 %
NEUTROPHILS # BLD AUTO: 6.73 K/UL
NEUTROPHILS NFR BLD AUTO: 87 %
PLATELET # BLD AUTO: 131 K/UL
POTASSIUM SERPL-SCNC: 4.1 MMOL/L
PROT SERPL-MCNC: 6.1 G/DL
RBC # BLD: 3.62 M/UL
RBC # FLD: 16.5 %
SODIUM SERPL-SCNC: 142 MMOL/L
TSH SERPL-ACNC: 4.74 UIU/ML
WBC # FLD AUTO: 7.59 K/UL

## 2018-04-23 ENCOUNTER — CLINICAL ADVICE (OUTPATIENT)
Age: 83
End: 2018-04-23

## 2018-05-29 ENCOUNTER — APPOINTMENT (OUTPATIENT)
Dept: HOME HEALTH SERVICES | Facility: HOME HEALTH | Age: 83
End: 2018-05-29

## 2018-06-08 ENCOUNTER — APPOINTMENT (OUTPATIENT)
Dept: HOME HEALTH SERVICES | Facility: HOME HEALTH | Age: 83
End: 2018-06-08
Payer: MEDICARE

## 2018-06-08 VITALS
DIASTOLIC BLOOD PRESSURE: 70 MMHG | OXYGEN SATURATION: 92 % | HEART RATE: 64 BPM | SYSTOLIC BLOOD PRESSURE: 140 MMHG | RESPIRATION RATE: 16 BRPM

## 2018-06-08 DIAGNOSIS — E78.5 HYPERLIPIDEMIA, UNSPECIFIED: ICD-10-CM

## 2018-06-08 PROCEDURE — 99350 HOME/RES VST EST HIGH MDM 60: CPT

## 2018-06-12 ENCOUNTER — CLINICAL ADVICE (OUTPATIENT)
Age: 83
End: 2018-06-12

## 2018-06-12 LAB — TSH SERPL-ACNC: 5.21 UIU/ML

## 2018-07-15 NOTE — ED ADULT TRIAGE NOTE - DIRECT TO ROOM CARE INITIATED:
Problem: Ventilation Defect:  Goal: Ability to achieve and maintain unassisted ventilation or tolerate decreased levels of ventilator support    Intervention: Support and monitor invasive and noninvasive mechanical ventilation  Adult Ventilation Update    Total Vent Days: 5    Patient Lines/Drains/Airways Status    Active Airway     Name: Placement date: Placement time: Site: Days:    Airway Group ET Tube Oral 8.0 07/11/18 2143   Oral   5              #FVC / Vital Capacity (liters) : 1.1 (07/15/18 1310)  NIF (cm H2O) : -30 (07/15/18 1310)  Rapid Shallow Breathing Index (RR/VT): 46 (07/15/18 1310)  Plateau Pressure (Q Shift): 20 (07/14/18 2210)  Static Compliance (ml / cm H2O): 35 (07/15/18 1559)      Barriers to SBT Weaning Trial Stopped due to:: Pt weaned for 1 hour and returned to rest settings per protocol (07/15/18 1340)      Sputum/Suction   Cough: Productive (07/15/18 1340)  Sputum Amount: Moderate (07/15/18 1340)  Sputum Color: Yellow;Tan (07/15/18 1340)  Sputum Consistency: Thick (07/15/18 1340)    Mobility  Level of Mobility: Level IV (07/15/18 1000)  Activity Performed: Edge of bed;Stand (march in place) (07/15/18 1000)  Time Activity Tolerated: 15 min (07/15/18 1000)    Events/Summary/Plan: spont bid, alexia well. Not ready for extubation per MD (07/15/18 5518)           20-Nov-2017 18:12

## 2018-08-07 ENCOUNTER — MEDICATION RENEWAL (OUTPATIENT)
Age: 83
End: 2018-08-07

## 2018-08-16 ENCOUNTER — APPOINTMENT (OUTPATIENT)
Dept: HOME HEALTH SERVICES | Facility: HOME HEALTH | Age: 83
End: 2018-08-16

## 2018-08-16 VITALS
SYSTOLIC BLOOD PRESSURE: 110 MMHG | DIASTOLIC BLOOD PRESSURE: 60 MMHG | RESPIRATION RATE: 16 BRPM | HEART RATE: 61 BPM | OXYGEN SATURATION: 96 % | TEMPERATURE: 98.3 F

## 2018-08-16 RX ORDER — SERTRALINE HYDROCHLORIDE 50 MG/1
50 TABLET, FILM COATED ORAL DAILY
Qty: 90 | Refills: 3 | Status: ACTIVE | COMMUNITY
Start: 2016-09-12

## 2018-08-16 RX ORDER — APIXABAN 2.5 MG/1
2.5 TABLET, FILM COATED ORAL
Qty: 180 | Refills: 3 | Status: ACTIVE | COMMUNITY
Start: 2016-12-19

## 2018-08-16 RX ORDER — NYSTATIN 100000 [USP'U]/G
100000 CREAM TOPICAL TWICE DAILY
Qty: 1 | Refills: 1 | Status: ACTIVE | COMMUNITY
Start: 2018-04-12

## 2018-08-16 RX ORDER — POTASSIUM CHLORIDE 750 MG/1
10 TABLET, EXTENDED RELEASE ORAL DAILY
Qty: 90 | Refills: 3 | Status: ACTIVE | COMMUNITY
Start: 2016-11-29

## 2018-08-16 RX ORDER — ATORVASTATIN CALCIUM 20 MG/1
20 TABLET, FILM COATED ORAL
Qty: 90 | Refills: 0 | Status: ACTIVE | COMMUNITY
Start: 2016-10-17

## 2018-08-16 RX ORDER — FUROSEMIDE 40 MG/1
40 TABLET ORAL DAILY
Qty: 90 | Refills: 3 | Status: ACTIVE | COMMUNITY
Start: 2016-11-07

## 2018-08-24 ENCOUNTER — APPOINTMENT (OUTPATIENT)
Dept: HOME HEALTH SERVICES | Facility: HOME HEALTH | Age: 83
End: 2018-08-24
Payer: MEDICARE

## 2018-08-24 VITALS
SYSTOLIC BLOOD PRESSURE: 110 MMHG | RESPIRATION RATE: 16 BRPM | OXYGEN SATURATION: 95 % | TEMPERATURE: 97.9 F | DIASTOLIC BLOOD PRESSURE: 60 MMHG | HEART RATE: 59 BPM

## 2018-08-24 DIAGNOSIS — I25.10 ATHEROSCLEROTIC HEART DISEASE OF NATIVE CORONARY ARTERY W/OUT ANGINA PECTORIS: ICD-10-CM

## 2018-08-24 DIAGNOSIS — R05 COUGH: ICD-10-CM

## 2018-08-24 PROCEDURE — 99350 HOME/RES VST EST HIGH MDM 60: CPT

## 2018-10-01 PROBLEM — L89.301 DECUBITUS ULCER OF BUTTOCK, STAGE 1: Status: ACTIVE | Noted: 2018-04-12

## 2018-10-02 ENCOUNTER — APPOINTMENT (OUTPATIENT)
Dept: HOME HEALTH SERVICES | Facility: HOME HEALTH | Age: 83
End: 2018-10-02

## 2018-10-02 RX ORDER — METOPROLOL SUCCINATE 50 MG/1
50 TABLET, EXTENDED RELEASE ORAL DAILY
Qty: 90 | Refills: 3 | Status: ACTIVE | COMMUNITY
Start: 2016-11-07 | End: 1900-01-01

## 2018-10-26 ENCOUNTER — APPOINTMENT (OUTPATIENT)
Dept: HOME HEALTH SERVICES | Facility: HOME HEALTH | Age: 83
End: 2018-10-26
Payer: MEDICARE

## 2018-10-26 DIAGNOSIS — E03.9 HYPOTHYROIDISM, UNSPECIFIED: ICD-10-CM

## 2018-10-26 PROCEDURE — 99350 HOME/RES VST EST HIGH MDM 60: CPT | Mod: 25

## 2018-10-26 PROCEDURE — 90662 IIV NO PRSV INCREASED AG IM: CPT

## 2018-10-26 PROCEDURE — G0008: CPT

## 2018-10-26 RX ORDER — LEVOTHYROXINE SODIUM 0.07 MG/1
75 TABLET ORAL DAILY
Qty: 90 | Refills: 3 | Status: ACTIVE | COMMUNITY
Start: 2016-12-20 | End: 1900-01-01

## 2019-01-09 ENCOUNTER — INPATIENT (INPATIENT)
Facility: HOSPITAL | Age: 84
LOS: 0 days | DRG: 640 | End: 2019-01-10
Attending: INTERNAL MEDICINE | Admitting: HOSPITALIST
Payer: MEDICARE

## 2019-01-09 ENCOUNTER — TRANSCRIPTION ENCOUNTER (OUTPATIENT)
Age: 84
End: 2019-01-09

## 2019-01-09 ENCOUNTER — LABORATORY RESULT (OUTPATIENT)
Age: 84
End: 2019-01-09

## 2019-01-09 ENCOUNTER — APPOINTMENT (OUTPATIENT)
Dept: HOME HEALTH SERVICES | Facility: HOME HEALTH | Age: 84
End: 2019-01-09
Payer: MEDICARE

## 2019-01-09 VITALS
HEART RATE: 72 BPM | DIASTOLIC BLOOD PRESSURE: 56 MMHG | SYSTOLIC BLOOD PRESSURE: 112 MMHG | TEMPERATURE: 98 F | OXYGEN SATURATION: 98 % | RESPIRATION RATE: 20 BRPM

## 2019-01-09 VITALS
SYSTOLIC BLOOD PRESSURE: 100 MMHG | DIASTOLIC BLOOD PRESSURE: 50 MMHG | TEMPERATURE: 97.6 F | OXYGEN SATURATION: 90 % | RESPIRATION RATE: 16 BRPM | HEART RATE: 75 BPM

## 2019-01-09 DIAGNOSIS — I48.91 UNSPECIFIED ATRIAL FIBRILLATION: ICD-10-CM

## 2019-01-09 DIAGNOSIS — N18.3 CHRONIC KIDNEY DISEASE, STAGE 3 (MODERATE): ICD-10-CM

## 2019-01-09 DIAGNOSIS — I73.9 PERIPHERAL VASCULAR DISEASE, UNSPECIFIED: ICD-10-CM

## 2019-01-09 DIAGNOSIS — R41.82 ALTERED MENTAL STATUS, UNSPECIFIED: ICD-10-CM

## 2019-01-09 DIAGNOSIS — F03.90 UNSPECIFIED DEMENTIA W/OUT BEHAVIORAL DISTURBANCE: ICD-10-CM

## 2019-01-09 DIAGNOSIS — Z90.49 ACQUIRED ABSENCE OF OTHER SPECIFIED PARTS OF DIGESTIVE TRACT: Chronic | ICD-10-CM

## 2019-01-09 DIAGNOSIS — G81.94 HEMIPLEGIA, UNSPECIFIED AFFECTING LEFT NONDOMINANT SIDE: ICD-10-CM

## 2019-01-09 DIAGNOSIS — D69.6 THROMBOCYTOPENIA, UNSPECIFIED: ICD-10-CM

## 2019-01-09 DIAGNOSIS — I10 ESSENTIAL (PRIMARY) HYPERTENSION: ICD-10-CM

## 2019-01-09 DIAGNOSIS — Z92.29 PERSONAL HISTORY OF OTHER DRUG THERAPY: ICD-10-CM

## 2019-01-09 DIAGNOSIS — I50.22 CHRONIC SYSTOLIC (CONGESTIVE) HEART FAILURE: ICD-10-CM

## 2019-01-09 PROCEDURE — 99291 CRITICAL CARE FIRST HOUR: CPT | Mod: GC

## 2019-01-09 PROCEDURE — 99350 HOME/RES VST EST HIGH MDM 60: CPT

## 2019-01-09 PROCEDURE — 93010 ELECTROCARDIOGRAM REPORT: CPT | Mod: GC

## 2019-01-09 RX ORDER — LOSARTAN POTASSIUM 100 MG/1
100 TABLET, FILM COATED ORAL
Qty: 90 | Refills: 3 | Status: DISCONTINUED | COMMUNITY
Start: 2016-12-19 | End: 2019-01-09

## 2019-01-09 NOTE — REASON FOR VISIT
[Follow-Up] : a follow-up visit [Formal Caregiver] : formal caregiver [Intercurrent Specialty/Sub-specialty Visits] : the patient has no intercurrent specialty/sub-specialty visits [FreeTextEntry1] : a fib

## 2019-01-09 NOTE — COMPREHENSIVE ASSESSMENT
[Comprehensive Assessment Reviewed] : Comprehensive assessment reviewed [No Action Needed] : No action needed

## 2019-01-09 NOTE — COUNSELING
[Normal Weight (BMI <25)] : normal weight - BMI <25 [Non - Smoker] : non-smoker [Use assistive device to avoid falls] : use assistive device to avoid falls [Not Indicated] : not indicated [Minimize unnecessary interventions] : minimize unnecessary interventions [Completed Medical Orders for Life-Sustaining Treatment] : completed medical orders for life-sustaining treatment [DNR] : Code Status: DNR [DNI] : Intubation: DNI [[facility] Date: _____] : [facility]last [facility]: [unfilled] [_____] : HCP: [unfilled]

## 2019-01-09 NOTE — PHYSICAL EXAM
[No Acute Distress] : no acute distress [Normal Sclera/Conjunctiva] : normal sclera/conjunctiva [Normal Outer Ear/Nose] : the ears and nose were normal in appearance [No JVD] : no jugular venous distention [Supple] : the neck was supple [No Respiratory Distress] : no respiratory distress [Normal Rate] : heart rate was normal  [Regular Rhythm] : with a regular rhythm [Normal S1, S2] : normal S1 and S2 [Normal Bowel Sounds] : normal bowel sounds [Non Tender] : non-tender [Soft] : abdomen soft [No Rash] : no rash [Normal Affect] : the affect was normal [de-identified] : awake. speaks in single words [de-identified] : 2/6 murmur. no edema [de-identified] : non ambulatory [de-identified] : mild weakness left leg

## 2019-01-09 NOTE — ED CLERICAL - NS ED CLERK NOTE PRE-ARRIVAL INFORMATION; ADDITIONAL PRE-ARRIVAL INFORMATION

## 2019-01-09 NOTE — CHRONIC CARE ASSESSMENT
[PPS Score: ____] : Palliative Performance Scale (PPS) Score: [unfilled] [Oriented To Person] : ~L oriented to person [Oriented To Place] : ~L oriented to place [Oriented To Time] : ~L oriented to time [Reviewed Mini-Cog Outcomes from Comprehensive Assessment] : Reviewed Mini-Cog outcomes from comprehensive assessment [Reviewed depression screen from comprehensive assessment] : Reviewed depression screen from comprehensive assessment [ADL Assessment Completed] : ADL assessment completed [iADL Assessment Completed] : iADL assessment completed [Reviewed Fall Risk from Comprehensive Assessment] : Reviewed fall risk from comprehensive assessment [Reviewed Home Safety Evaluation] : Reviewed home safety evaluation [No Action Needed] : No action needed [de-identified] : none

## 2019-01-09 NOTE — HISTORY OF PRESENT ILLNESS
[Family Member] : family member [Formal Caregiver] : formal caregiver [FreeTextEntry1] : dementia, gait instability [FreeTextEntry2] : 89 yo with moderate  dementia , recurrent UTI's, CHF, High chol, HTN, hypothyroid, a fib-a. flutter\par \par Dementia- worsening. trouble swallowing. coughs when eating, post tussive emesis.\par mental status acutely worse since yesterday. Not eating, not responding as usual. no fever. +wet sounding cough\par A fib- a. flutter. on eliquis, rate controlled. on metoprolol 50. no CP, no SOB\par HTN- on losartan 100, metoprolol 50 on lasix and K.\par CHF- controlled on lasix 40\par hypothyroid- on synthroid 75, did not increase dose as rx'ed\par Depression - on sertraline 50\par sleeps well, no constipation. losing weight\par

## 2019-01-10 VITALS
DIASTOLIC BLOOD PRESSURE: 64 MMHG | SYSTOLIC BLOOD PRESSURE: 138 MMHG | HEART RATE: 86 BPM | OXYGEN SATURATION: 100 % | RESPIRATION RATE: 18 BRPM | TEMPERATURE: 98 F

## 2019-01-10 DIAGNOSIS — N18.9 CHRONIC KIDNEY DISEASE, UNSPECIFIED: ICD-10-CM

## 2019-01-10 DIAGNOSIS — I10 ESSENTIAL (PRIMARY) HYPERTENSION: ICD-10-CM

## 2019-01-10 DIAGNOSIS — E87.0 HYPEROSMOLALITY AND HYPERNATREMIA: ICD-10-CM

## 2019-01-10 DIAGNOSIS — Z29.9 ENCOUNTER FOR PROPHYLACTIC MEASURES, UNSPECIFIED: ICD-10-CM

## 2019-01-10 DIAGNOSIS — I50.9 HEART FAILURE, UNSPECIFIED: ICD-10-CM

## 2019-01-10 DIAGNOSIS — I25.10 ATHEROSCLEROTIC HEART DISEASE OF NATIVE CORONARY ARTERY WITHOUT ANGINA PECTORIS: ICD-10-CM

## 2019-01-10 DIAGNOSIS — I48.91 UNSPECIFIED ATRIAL FIBRILLATION: ICD-10-CM

## 2019-01-10 DIAGNOSIS — E03.9 HYPOTHYROIDISM, UNSPECIFIED: ICD-10-CM

## 2019-01-10 DIAGNOSIS — N39.0 URINARY TRACT INFECTION, SITE NOT SPECIFIED: ICD-10-CM

## 2019-01-10 DIAGNOSIS — R53.83 OTHER FATIGUE: ICD-10-CM

## 2019-01-10 DIAGNOSIS — D69.6 THROMBOCYTOPENIA, UNSPECIFIED: ICD-10-CM

## 2019-01-10 LAB
ALBUMIN SERPL ELPH-MCNC: 3.6 G/DL — SIGNIFICANT CHANGE UP (ref 3.3–5)
ALBUMIN SERPL ELPH-MCNC: 3.9 G/DL
ALP BLD-CCNC: 117 U/L
ALP SERPL-CCNC: 109 U/L — SIGNIFICANT CHANGE UP (ref 40–120)
ALT FLD-CCNC: 16 U/L — SIGNIFICANT CHANGE UP (ref 10–45)
ALT SERPL-CCNC: 14 U/L
ANION GAP SERPL CALC-SCNC: 16 MMOL/L — SIGNIFICANT CHANGE UP (ref 5–17)
ANION GAP SERPL CALC-SCNC: 18 MMOL/L
ANION GAP SERPL CALC-SCNC: 21 MMOL/L — HIGH (ref 5–17)
APPEARANCE: ABNORMAL
APTT BLD: 34.8 SEC — SIGNIFICANT CHANGE UP (ref 27.5–36.3)
AST SERPL-CCNC: 18 U/L
AST SERPL-CCNC: 24 U/L — SIGNIFICANT CHANGE UP (ref 10–40)
BASOPHILS # BLD AUTO: 0 K/UL — SIGNIFICANT CHANGE UP (ref 0–0.2)
BASOPHILS # BLD AUTO: 0.05 K/UL
BASOPHILS NFR BLD AUTO: 0.4 % — SIGNIFICANT CHANGE UP (ref 0–2)
BASOPHILS NFR BLD AUTO: 0.5 %
BILIRUB SERPL-MCNC: 0.5 MG/DL — SIGNIFICANT CHANGE UP (ref 0.2–1.2)
BILIRUB SERPL-MCNC: 0.7 MG/DL
BILIRUBIN URINE: NEGATIVE
BLOOD URINE: ABNORMAL
BUN SERPL-MCNC: 102 MG/DL — HIGH (ref 7–23)
BUN SERPL-MCNC: 104 MG/DL — HIGH (ref 7–23)
BUN SERPL-MCNC: 89 MG/DL
CALCIUM SERPL-MCNC: 8.6 MG/DL — SIGNIFICANT CHANGE UP (ref 8.4–10.5)
CALCIUM SERPL-MCNC: 8.9 MG/DL — SIGNIFICANT CHANGE UP (ref 8.4–10.5)
CALCIUM SERPL-MCNC: 9 MG/DL
CHLORIDE SERPL-SCNC: 119 MMOL/L — HIGH (ref 96–108)
CHLORIDE SERPL-SCNC: 121 MMOL/L — HIGH (ref 96–108)
CHLORIDE SERPL-SCNC: 124 MMOL/L
CO2 SERPL-SCNC: 22 MMOL/L — SIGNIFICANT CHANGE UP (ref 22–31)
CO2 SERPL-SCNC: 27 MMOL/L
CO2 SERPL-SCNC: 27 MMOL/L — SIGNIFICANT CHANGE UP (ref 22–31)
COLOR: YELLOW
CREAT SERPL-MCNC: 2.73 MG/DL
CREAT SERPL-MCNC: 2.74 MG/DL — HIGH (ref 0.5–1.3)
CREAT SERPL-MCNC: 2.83 MG/DL — HIGH (ref 0.5–1.3)
EOSINOPHIL # BLD AUTO: 0.06 K/UL
EOSINOPHIL # BLD AUTO: 0.1 K/UL — SIGNIFICANT CHANGE UP (ref 0–0.5)
EOSINOPHIL NFR BLD AUTO: 0.6 %
EOSINOPHIL NFR BLD AUTO: 0.9 % — SIGNIFICANT CHANGE UP (ref 0–6)
GAS PNL BLDV: SIGNIFICANT CHANGE UP
GLUCOSE QUALITATIVE U: NEGATIVE MG/DL
GLUCOSE SERPL-MCNC: 110 MG/DL — HIGH (ref 70–99)
GLUCOSE SERPL-MCNC: 173 MG/DL — HIGH (ref 70–99)
HCT VFR BLD CALC: 46.4 % — HIGH (ref 34.5–45)
HCT VFR BLD CALC: 49.4 %
HGB BLD-MCNC: 14.5 G/DL — SIGNIFICANT CHANGE UP (ref 11.5–15.5)
HGB BLD-MCNC: 14.9 G/DL
IMM GRANULOCYTES NFR BLD AUTO: 0.5 %
INR BLD: 1.53 RATIO — HIGH (ref 0.88–1.16)
KETONES URINE: NEGATIVE
LEUKOCYTE ESTERASE URINE: ABNORMAL
LYMPHOCYTES # BLD AUTO: 0.5 K/UL — LOW (ref 1–3.3)
LYMPHOCYTES # BLD AUTO: 0.53 K/UL
LYMPHOCYTES # BLD AUTO: 5.2 % — LOW (ref 13–44)
LYMPHOCYTES NFR BLD AUTO: 5.5 %
MAN DIFF?: NORMAL
MCHC RBC-ENTMCNC: 28.6 PG
MCHC RBC-ENTMCNC: 29.3 PG — SIGNIFICANT CHANGE UP (ref 27–34)
MCHC RBC-ENTMCNC: 30.2 GM/DL
MCHC RBC-ENTMCNC: 31.2 GM/DL — LOW (ref 32–36)
MCV RBC AUTO: 93.7 FL — SIGNIFICANT CHANGE UP (ref 80–100)
MCV RBC AUTO: 94.8 FL
MONOCYTES # BLD AUTO: 0.9 K/UL — SIGNIFICANT CHANGE UP (ref 0–0.9)
MONOCYTES # BLD AUTO: 1.09 K/UL
MONOCYTES NFR BLD AUTO: 11.4 %
MONOCYTES NFR BLD AUTO: 9 % — SIGNIFICANT CHANGE UP (ref 2–14)
NEUTROPHILS # BLD AUTO: 7.77 K/UL
NEUTROPHILS # BLD AUTO: 8.8 K/UL — HIGH (ref 1.8–7.4)
NEUTROPHILS NFR BLD AUTO: 81.5 %
NEUTROPHILS NFR BLD AUTO: 84.5 % — HIGH (ref 43–77)
NITRITE URINE: NEGATIVE
OSMOLALITY UR: 477 MOS/KG — SIGNIFICANT CHANGE UP (ref 300–900)
PH URINE: 7
PLATELET # BLD AUTO: 118 K/UL — LOW (ref 150–400)
PLATELET # BLD AUTO: 121 K/UL
POTASSIUM SERPL-MCNC: 4 MMOL/L — SIGNIFICANT CHANGE UP (ref 3.5–5.3)
POTASSIUM SERPL-MCNC: 4 MMOL/L — SIGNIFICANT CHANGE UP (ref 3.5–5.3)
POTASSIUM SERPL-SCNC: 3.9 MMOL/L
POTASSIUM SERPL-SCNC: 4 MMOL/L — SIGNIFICANT CHANGE UP (ref 3.5–5.3)
POTASSIUM SERPL-SCNC: 4 MMOL/L — SIGNIFICANT CHANGE UP (ref 3.5–5.3)
PROT SERPL-MCNC: 7 G/DL — SIGNIFICANT CHANGE UP (ref 6–8.3)
PROT SERPL-MCNC: 7.1 G/DL
PROTEIN URINE: 100 MG/DL
PROTHROM AB SERPL-ACNC: 17.7 SEC — HIGH (ref 10–12.9)
RBC # BLD: 4.96 M/UL — SIGNIFICANT CHANGE UP (ref 3.8–5.2)
RBC # BLD: 5.21 M/UL
RBC # FLD: 16.5 % — HIGH (ref 10.3–14.5)
RBC # FLD: 19 %
SODIUM SERPL-SCNC: 162 MMOL/L — CRITICAL HIGH (ref 135–145)
SODIUM SERPL-SCNC: 164 MMOL/L — CRITICAL HIGH (ref 135–145)
SODIUM SERPL-SCNC: 169 MMOL/L
SPECIFIC GRAVITY URINE: 1.02
TSH SERPL-ACNC: 1.43 UIU/ML
UROBILINOGEN URINE: NEGATIVE MG/DL
WBC # BLD: 10.4 K/UL — SIGNIFICANT CHANGE UP (ref 3.8–10.5)
WBC # FLD AUTO: 10.4 K/UL — SIGNIFICANT CHANGE UP (ref 3.8–10.5)
WBC # FLD AUTO: 9.55 K/UL

## 2019-01-10 PROCEDURE — 82947 ASSAY GLUCOSE BLOOD QUANT: CPT

## 2019-01-10 PROCEDURE — 82435 ASSAY OF BLOOD CHLORIDE: CPT

## 2019-01-10 PROCEDURE — 85610 PROTHROMBIN TIME: CPT

## 2019-01-10 PROCEDURE — 85027 COMPLETE CBC AUTOMATED: CPT

## 2019-01-10 PROCEDURE — 82330 ASSAY OF CALCIUM: CPT

## 2019-01-10 PROCEDURE — 93005 ELECTROCARDIOGRAM TRACING: CPT

## 2019-01-10 PROCEDURE — 80048 BASIC METABOLIC PNL TOTAL CA: CPT

## 2019-01-10 PROCEDURE — 83935 ASSAY OF URINE OSMOLALITY: CPT

## 2019-01-10 PROCEDURE — 83605 ASSAY OF LACTIC ACID: CPT

## 2019-01-10 PROCEDURE — 71045 X-RAY EXAM CHEST 1 VIEW: CPT

## 2019-01-10 PROCEDURE — 85730 THROMBOPLASTIN TIME PARTIAL: CPT

## 2019-01-10 PROCEDURE — 71045 X-RAY EXAM CHEST 1 VIEW: CPT | Mod: 26

## 2019-01-10 PROCEDURE — 96374 THER/PROPH/DIAG INJ IV PUSH: CPT

## 2019-01-10 PROCEDURE — 99223 1ST HOSP IP/OBS HIGH 75: CPT | Mod: GC

## 2019-01-10 PROCEDURE — 82803 BLOOD GASES ANY COMBINATION: CPT

## 2019-01-10 PROCEDURE — 84132 ASSAY OF SERUM POTASSIUM: CPT

## 2019-01-10 PROCEDURE — 80053 COMPREHEN METABOLIC PANEL: CPT

## 2019-01-10 PROCEDURE — 99285 EMERGENCY DEPT VISIT HI MDM: CPT | Mod: 25

## 2019-01-10 PROCEDURE — 87086 URINE CULTURE/COLONY COUNT: CPT

## 2019-01-10 PROCEDURE — 84295 ASSAY OF SERUM SODIUM: CPT

## 2019-01-10 PROCEDURE — 85014 HEMATOCRIT: CPT

## 2019-01-10 RX ORDER — SODIUM CHLORIDE 9 MG/ML
250 INJECTION INTRAMUSCULAR; INTRAVENOUS; SUBCUTANEOUS ONCE
Qty: 0 | Refills: 0 | Status: COMPLETED | OUTPATIENT
Start: 2019-01-10 | End: 2019-01-10

## 2019-01-10 RX ORDER — ATORVASTATIN CALCIUM 80 MG/1
1 TABLET, FILM COATED ORAL
Qty: 0 | Refills: 0 | COMMUNITY

## 2019-01-10 RX ORDER — APIXABAN 2.5 MG/1
2.5 TABLET, FILM COATED ORAL EVERY 12 HOURS
Qty: 0 | Refills: 0 | Status: DISCONTINUED | OUTPATIENT
Start: 2019-01-10 | End: 2019-01-10

## 2019-01-10 RX ORDER — METRONIDAZOLE 500 MG
500 TABLET ORAL ONCE
Qty: 0 | Refills: 0 | Status: COMPLETED | OUTPATIENT
Start: 2019-01-10 | End: 2019-01-10

## 2019-01-10 RX ORDER — METOPROLOL TARTRATE 50 MG
1 TABLET ORAL
Qty: 0 | Refills: 0 | COMMUNITY

## 2019-01-10 RX ORDER — SODIUM CHLORIDE 9 MG/ML
250 INJECTION, SOLUTION INTRAVENOUS ONCE
Qty: 0 | Refills: 0 | Status: COMPLETED | OUTPATIENT
Start: 2019-01-10 | End: 2019-01-10

## 2019-01-10 RX ORDER — FUROSEMIDE 40 MG
1 TABLET ORAL
Qty: 0 | Refills: 0 | COMMUNITY

## 2019-01-10 RX ORDER — METRONIDAZOLE 500 MG
500 TABLET ORAL EVERY 8 HOURS
Qty: 0 | Refills: 0 | Status: DISCONTINUED | OUTPATIENT
Start: 2019-01-10 | End: 2019-01-10

## 2019-01-10 RX ORDER — LEVOTHYROXINE SODIUM 125 MCG
1 TABLET ORAL
Qty: 0 | Refills: 0 | COMMUNITY

## 2019-01-10 RX ORDER — SERTRALINE 25 MG/1
1 TABLET, FILM COATED ORAL
Qty: 0 | Refills: 0 | COMMUNITY

## 2019-01-10 RX ORDER — CEFTRIAXONE 500 MG/1
1 INJECTION, POWDER, FOR SOLUTION INTRAMUSCULAR; INTRAVENOUS EVERY 24 HOURS
Qty: 0 | Refills: 0 | Status: DISCONTINUED | OUTPATIENT
Start: 2019-01-10 | End: 2019-01-10

## 2019-01-10 RX ORDER — LEVOTHYROXINE SODIUM 125 MCG
75 TABLET ORAL DAILY
Qty: 0 | Refills: 0 | Status: DISCONTINUED | OUTPATIENT
Start: 2019-01-10 | End: 2019-01-10

## 2019-01-10 RX ORDER — NYSTATIN CREAM 100000 [USP'U]/G
1 CREAM TOPICAL
Qty: 0 | Refills: 0 | COMMUNITY

## 2019-01-10 RX ORDER — CEFTRIAXONE 500 MG/1
1 INJECTION, POWDER, FOR SOLUTION INTRAMUSCULAR; INTRAVENOUS ONCE
Qty: 0 | Refills: 0 | Status: COMPLETED | OUTPATIENT
Start: 2019-01-10 | End: 2019-01-10

## 2019-01-10 RX ORDER — SERTRALINE 25 MG/1
50 TABLET, FILM COATED ORAL DAILY
Qty: 0 | Refills: 0 | Status: DISCONTINUED | OUTPATIENT
Start: 2019-01-10 | End: 2019-01-10

## 2019-01-10 RX ORDER — ATORVASTATIN CALCIUM 80 MG/1
20 TABLET, FILM COATED ORAL AT BEDTIME
Qty: 0 | Refills: 0 | Status: DISCONTINUED | OUTPATIENT
Start: 2019-01-10 | End: 2019-01-10

## 2019-01-10 RX ORDER — LOSARTAN POTASSIUM 100 MG/1
1 TABLET, FILM COATED ORAL
Qty: 0 | Refills: 0 | COMMUNITY

## 2019-01-10 RX ORDER — POTASSIUM CHLORIDE 20 MEQ
1 PACKET (EA) ORAL
Qty: 0 | Refills: 0 | COMMUNITY

## 2019-01-10 RX ORDER — METRONIDAZOLE 500 MG
TABLET ORAL
Qty: 0 | Refills: 0 | Status: DISCONTINUED | OUTPATIENT
Start: 2019-01-10 | End: 2019-01-10

## 2019-01-10 RX ADMIN — Medication 100 MILLIGRAM(S): at 09:09

## 2019-01-10 RX ADMIN — CEFTRIAXONE 100 GRAM(S): 500 INJECTION, POWDER, FOR SOLUTION INTRAMUSCULAR; INTRAVENOUS at 01:38

## 2019-01-10 RX ADMIN — SODIUM CHLORIDE 250 MILLILITER(S): 9 INJECTION INTRAMUSCULAR; INTRAVENOUS; SUBCUTANEOUS at 01:00

## 2019-01-10 RX ADMIN — SODIUM CHLORIDE 250 MILLILITER(S): 9 INJECTION, SOLUTION INTRAVENOUS at 05:28

## 2019-01-10 RX ADMIN — SODIUM CHLORIDE 250 MILLILITER(S): 9 INJECTION, SOLUTION INTRAVENOUS at 03:01

## 2019-01-10 NOTE — PROGRESS NOTE ADULT - SUBJECTIVE AND OBJECTIVE BOX
Dr. Michael Cervantes  Internal Medicine PGY-1   Pager# [phone]    Patient is a 89y old  Female who presents with a chief complaint of Hypernatremia, lethargy (10 Trent 2019 06:16)      SUBJECTIVE / OVERNIGHT EVENTS:    MEDICATIONS  (STANDING):  apixaban 2.5 milliGRAM(s) Oral every 12 hours  atorvastatin 20 milliGRAM(s) Oral at bedtime  cefTRIAXone   IVPB 1 Gram(s) IV Intermittent every 24 hours  levothyroxine 75 MICROGram(s) Oral daily  sertraline 50 milliGRAM(s) Oral daily    MEDICATIONS  (PRN):      Vital Signs Last 24 Hrs  T(C): 36.5 (10 Trent 2019 07:22), Max: 37.1 (10 Trent 2019 06:27)  T(F): 97.7 (10 Trent 2019 07:22), Max: 98.7 (10 Trent 2019 06:27)  HR: 72 (10 Trent 2019 07:22) (72 - 82)  BP: 144/69 (10 Trent 2019 07:22) (112/56 - 144/69)  BP(mean): --  RR: 17 (10 Tretn 2019 07:22) (16 - 20)  SpO2: 100% (10 Trent 2019 07:22) (95% - 100%)  CAPILLARY BLOOD GLUCOSE        I&O's Summary      PHYSICAL EXAM:  GENERAL: NAD, well-developed  HEAD:  Atraumatic, Normocephalic  EYES: EOMI, PERRLA, conjunctiva and sclera clear  NECK: Supple, No JVD  CHEST/LUNG: Clear to auscultation bilaterally; No wheeze  HEART: Regular rate and rhythm; No murmurs, rubs, or gallops  ABDOMEN: Soft, Nontender, Nondistended; Bowel sounds present  EXTREMITIES:  2+ Peripheral Pulses, No clubbing, cyanosis, or edema  PSYCH: AAOx3  NEUROLOGY: non-focal  SKIN: No rashes or lesions    Constitutional: In no acute distress, frail elderly female   	Eyes: PERRL, clear conjunctiva  	ENMT: dry mucous membranes   	Respiratory: Lungs CTAB; no wheezes, rales or rhonchi. +Upper respiratory congestion sounds  	Cardiovascular: Normal S1 and S2; no murmurs. No lower extremity edema   	Gastrointestinal: Abdomen soft, nontender, nondistended, normal bowel sounds  	Genitourinary: pt in diaper  	Vascular: 2+ peripheral pulses  Psychiatric: A&O x 1 (knows name); alert, arousable to voice and touch; follows commands      LABS:                        14.5   10.4  )-----------( 118      ( 10 Trent 2019 00:39 )             46.4     01-10    162<HH>  |  119<H>  |  104<H>  ----------------------------<  110<H>  4.0   |  27  |  2.83<H>    Ca    8.6      10 Trent 2019 06:59    TPro  7.0  /  Alb  3.6  /  TBili  0.5  /  DBili  x   /  AST  24  /  ALT  16  /  AlkPhos  109  01-10    PT/INR - ( 10 Trent 2019 00:39 )   PT: 17.7 sec;   INR: 1.53 ratio         PTT - ( 10 Trent 2019 00:39 )  PTT:34.8 sec Dr. Michael Cervantes  Internal Medicine PGY-1   Pager# [phone]    Patient is a 89y old  Female who presents with a chief complaint of Hypernatremia, lethargy (10 Trent 2019 06:16)      SUBJECTIVE / OVERNIGHT EVENTS: Seen at bedside with home health aide and son. Patient denies any SOB, chest pain, abdominal pain, fevers, chills. Patient is AAOx1-2. Spoke with other son (physician) who affirmed GOC. Patient is DNR/DNI. Family is in agreement to not have excessive testing in terms of radiology scans. Son wanted team to use clinical judgement and if she needs to be treated.     MEDICATIONS  (STANDING):  apixaban 2.5 milliGRAM(s) Oral every 12 hours  atorvastatin 20 milliGRAM(s) Oral at bedtime  cefTRIAXone   IVPB 1 Gram(s) IV Intermittent every 24 hours  levothyroxine 75 MICROGram(s) Oral daily  sertraline 50 milliGRAM(s) Oral daily    Vital Signs Last 24 Hrs  T(C): 36.5 (10 Trent 2019 07:22), Max: 37.1 (10 Trent 2019 06:27)  T(F): 97.7 (10 Trent 2019 07:22), Max: 98.7 (10 Trent 2019 06:27)  HR: 72 (10 Trent 2019 07:22) (72 - 82)  BP: 144/69 (10 Trent 2019 07:22) (112/56 - 144/69)  BP(mean): --  RR: 17 (10 Trent 2019 07:22) (16 - 20)  SpO2: 100% (10 Trent 2019 07:22) (95% - 100%)  CAPILLARY BLOOD GLUCOSE      I&O's Summary      PHYSICAL EXAM:  GENERAL: NAD, frail elderly appearing. Dry mucous membranes.  HEAD:  Atraumatic, Normocephalic  EYES: EOMI, PERRLA, conjunctiva and sclera clear.  NECK: Supple, No JVD  CHEST/LUNG: Decreased breath sounds on left posterior lung field. Inspiratory crackles posterior lung fields up to mid thorax.   HEART: Regular rate and irregular rhythm; S1,S2 2-3/6 systolic murmur.   ABDOMEN: Soft, Nontender, Nondistended; Bowel sounds present  EXTREMITIES:  2+ Peripheral Pulses, No clubbing, cyanosis, or edema  PSYCH: AAOx1-2  NEUROLOGY: non-focal, able to somewhat follow commands.   SKIN: No rashes or lesions        LABS:                        14.5   10.4  )-----------( 118      ( 10 Trent 2019 00:39 )             46.4     01-10    162<HH>  |  119<H>  |  104<H>  ----------------------------<  110<H>  4.0   |  27  |  2.83<H>    Ca    8.6      10 Trent 2019 06:59    TPro  7.0  /  Alb  3.6  /  TBili  0.5  /  DBili  x   /  AST  24  /  ALT  16  /  AlkPhos  109  01-10    PT/INR - ( 10 Trent 2019 00:39 )   PT: 17.7 sec;   INR: 1.53 ratio         PTT - ( 10 Trent 2019 00:39 )  PTT:34.8 sec        Na+ Trend: 162 mmol/L<--, 164 mmol/L

## 2019-01-10 NOTE — ED PROVIDER NOTE - CRITICAL CARE PROVIDED
additional history taking/direct patient care (not related to procedure)/interpretation of diagnostic studies/consult w/ pt's family directly relating to pts condition/documentation/conducted a detailed discussion of DNR status

## 2019-01-10 NOTE — ED PROVIDER NOTE - PROGRESS NOTE DETAILS
Spoke with renal about hypernatremia and suggestions for correction.  First we will give volume with 250 bolus of NS followed by 3 more 250 boluses of LR.  After total of 1L, recheck BMP and will likely add D5W for correction.  - Alyssia Stack, DO Spoke with renal about hypernatremia and suggestions for correction.  First we will give volume with 250 bolus of NS followed by 3 more 250 boluses of LR.  After total of 1L, recheck BMP and continue with D5W for correction based on sodium level on BMP.  - Alyssia Stack,

## 2019-01-10 NOTE — ED PROVIDER NOTE - MEDICAL DECISION MAKING DETAILS
89F p/w abnormal labs (hypernatremia) 89F p/w abnormal labs (hypernatremia).  Pt more lethargic and family also concerned about UTI.  Pt hypoxic on room air upon arrival and with dry mucus membranes.  Will check labs, urine, cxr, ekg, provide gentle hydration and likely admission.  - Alyssia Stack DO

## 2019-01-10 NOTE — DISCHARGE NOTE FOR THE EXPIRED PATIENT - HOSPITAL COURSE
H&P    Patient is a 89 year old female with dementia (baseline A&O x 1-2), bedbound, A. fib on eliquis, CKD, ITP (s/p bone marrow biopsy), aortic stenosis, ?CHF, presenting overnight after home medical services were called due to lethargy and confusion, noted by family members. She had labs checked outpatient which revealed hypernatremia to 169. Patient also had a positive urinalysis prior to coming to hospital. Per family, patient was spitting up food and liquids yesterday. Her mental status acutely worsened. Per outpatient home care note, patient's lasix and losartan were held due to hypertension yesterday. Patient has a 24/7 home health aide who reported that currently, after receiving fluids, patient's mental status is a little improved, she is more alert and responding to questions. However, yesterday patient's eyes rolled back and she was almost unresponsive. Per the aide, patient with no fevers, chills, nausea, vomiting (although she was spititng up food), no diarrhea. Patient is incontinent of bowel and bladder and wears diaper. She cannot ambulate.     In the ED, patient was afebrile, T 98.2, HR 72, /56, satting 96% requriing 6L NC. Labs notable for initial Na 164, WBC 10.4, BUN/Cr elevated to 102/2.74, and outpatient UA from yesterday showing numerous bacteria and WBC's, with +LE. Patient given 250 cc NS followed by 250 cc LR x 2, pending repeat sodium level.     HOSPITAL COURSE    Patient was admitted to internal medicine and ED course was as above. Patient was seen AAOx1-2 (baseline) at baseline and examined found to be stable saturating at 95% on NC 2L/min, hemodynamically satble. Patient was being fed and was coughing and developed secretions and subsequently became unresponsive. Was found to have no pulse. Patient is DNR/DNI and chest compressions were not initiated. Was pronounced dead at 10:28AM 1/10/19, pupils fixed and unreactive bilaterally, no spontaneous respirations, and not heart sounds auscultated. Autopsy was discussed with son who had declined one.

## 2019-01-10 NOTE — ED ADULT NURSE NOTE - OBJECTIVE STATEMENT
89 y/o female a+ox1, pmhx ITP, CHF, CKD, HLD, CAD, HTN, afib, coming from home via EMS for abnormal labs. Pt is a house call patient who had blood work completed at home found hypernatremia of "169"; aid at bedside states pt is "more lethargic". No noted fevers or chills, chest pain or discomfort, headache, lightheadedness, dizziness, abdominal pain, n/v/d. VS documented, pt has room air spo2 84%; oxygen provided via nc at 6L with spo2 improvement to 96%. IV placed, labs obtained. Pt left in position of comfort, will reassess

## 2019-01-10 NOTE — ED ADULT NURSE NOTE - NSIMPLEMENTINTERV_GEN_ALL_ED
Implemented All Fall with Harm Risk Interventions:  [place] to call system. Call bell, personal items and telephone within reach. Instruct patient to call for assistance. Room bathroom lighting operational. Non-slip footwear when patient is off stretcher. Physically safe environment: no spills, clutter or unnecessary equipment. Stretcher in lowest position, wheels locked, appropriate side rails in place. Provide visual cue, wrist band, yellow gown, etc. Monitor gait and stability. Monitor for mental status changes and reorient to person, place, and time. Review medications for side effects contributing to fall risk. Reinforce activity limits and safety measures with patient and family. Provide visual clues: red socks.

## 2019-01-10 NOTE — PROGRESS NOTE ADULT - PROBLEM SELECTOR PLAN 7
PLTS 118k at this time, stable, no active bleeding.   In setting of ITP (diagnosed by bone marrow biopsy)  Continue to monitor. PLTS 118k at this time, stable, no active bleeding.  In setting of ITP (diagnosed by bone marrow biopsy)    - Continue to monitor.

## 2019-01-10 NOTE — H&P ADULT - PROBLEM SELECTOR PLAN 2
Likely multifactorial- hypernatremia along with suspected UTI based on outpatient UA, superimposed on existing dementia in an elderly female.  - per home aide at bedside, her mental status is already improving, almost back to baseline (pt now responding to questions).

## 2019-01-10 NOTE — PROGRESS NOTE ADULT - PROBLEM SELECTOR PLAN 10
DVT ppx: Patient already on eliquis BID   Diet: DASH/TLC diet DVT ppx: Patient already on eliquis BID   Diet: DASH/TLC diet  GOC: DNR/DNI

## 2019-01-10 NOTE — H&P ADULT - PROBLEM SELECTOR PLAN 1
Patient with initial inpatient sodium of 164.  s/p  cc bolus, followed by 2  cc boluses.   Pending repeat BMP.  Based on urine osmolality of 477, this is likely in setting of renal H2O loss- differential includes partial DI, osmotic diuresis, or loop diuretics.   Likely in setting of impaired access to free water in setting of elderly patient with altered mental status, perhaps 2/2 active infection (UTI). Patient with initial inpatient sodium of 164.  s/p  cc bolus, followed by 2  cc boluses.   Pending repeat BMP. Free water deficit 3.8L.   Based on urine osmolality of 477, this is likely in setting of renal H2O loss- differential includes partial DI (less likely), osmotic diuresis, or loop diuretics.   Likely in setting of impaired access to free water in setting of elderly patient with altered mental status, perhaps 2/2 active infection (UTI). Patient with initial inpatient sodium of 164. goal no less than Na 156 in 24 hours.   s/p  cc bolus, followed by 2  cc boluses.   Pending repeat BMP. Free water deficit 3.8L.   Based on urine osmolality of 477, this is likely in setting of renal H2O loss- differential includes partial DI (less likely), osmotic diuresis, or loop diuretics.   Likely in setting of impaired access to free water in setting of elderly patient with altered mental status, perhaps 2/2 active infection (UTI).

## 2019-01-10 NOTE — DISCHARGE NOTE FOR THE EXPIRED PATIENT - SECONDARY DIAGNOSIS.
Urinary tract infection without hematuria, site unspecified Hypernatremia Aspiration into airway, initial encounter

## 2019-01-10 NOTE — H&P ADULT - PROBLEM SELECTOR PLAN 10
DVT ppx: Patient already on eliquis BID   Diet: DASH/TLC diet     Mary Munoz MD PGY2  Medicine Night Admit Resident  Pager [phone]

## 2019-01-10 NOTE — PROGRESS NOTE ADULT - ASSESSMENT
89 year old female with dementia (baseline A&O x 1-2), bedbound, A. fib on eliquis, CKD, ITP (s/p bone marrow biopsy), aortic stenosis, ?CHF, hypothyroidism, admitted for hypernatremia to Na 169 (outpatient), 164 (inpatient), likely in setting of poor PO intake in elderly patient with dementia.

## 2019-01-10 NOTE — H&P ADULT - PROBLEM SELECTOR PLAN 5
Pt with creatinine of 2.74 (no recent creatinine- 2.74, last known Cr was 1.52 4/2018).   BUN/Cr ratio>20, likely pre-renal in setting of reported poor PO intake with spitting up food/liquids.  - would check urine lytes JOHN - Pt with creatinine of 2.74 (no recent creatinine- 2.74, last known Cr was 1.52 4/2018).   BUN/Cr ratio>20, likely pre-renal in setting of reported poor PO intake with spitting up food/liquids.  - would check urine lytes

## 2019-01-10 NOTE — PROGRESS NOTE ADULT - PROBLEM SELECTOR PLAN 1
Patient with initial inpatient sodium of 164. goal no less than Na 156 in 24 hours.   s/p  cc bolus, followed by 2  cc boluses.   Pending repeat BMP. Free water deficit 3.8L.   Based on urine osmolality of 477, this is likely in setting of renal H2O loss- differential includes partial DI (less likely), osmotic diuresis, or loop diuretics.   Likely in setting of impaired access to free water in setting of elderly patient with altered mental status, perhaps 2/2 active infection (UTI). Patient with initial inpatient sodium of 164. goal no less than Na 156 in 24 hours.   s/p  cc bolus, followed by 2  cc boluses.     - Pending repeat BMP. Free water deficit 3.8L.   - Based on urine osmolality of 477, this is likely in setting of renal H2O loss- differential includes partial DI (less likely), osmotic diuresis, or loop diuretics.   - Likely in setting of impaired access to free water in setting of elderly patient with altered mental status, perhaps 2/2 active infection (UTI).

## 2019-01-10 NOTE — ED PROVIDER NOTE - ATTENDING CONTRIBUTION TO CARE
****ATTENDING**** 89yo f hx listed BIB daughters for weakness and decreased PO intake. As per daughter, pt has had a decline over the past few days in activity and food intake, usually precipitates w a urine infection. Pt had a house visit that showed pt had abnormal labs and sent to the hospital. Pt limited historian at baseline mental status per family oriented x 1.   on exam, pt appears to have dry mmm. Patient is lethargic. Patient's chest is clear to ausculation, +s1s2. Abdomen is soft nd/nt +BS. Extremity with no swelling or calf tenderness.   CHeck labs, nielson culture, urine lytes. Will consult micu and renal w fluid management. Pt lethargic but arousable and able to converse.    Daughter at bedside states pt is DNR, will speak to her brother who  both together are HCP.

## 2019-01-10 NOTE — H&P ADULT - PROBLEM SELECTOR PLAN 9
- holding lasix and losartan due to JOHN and hypernatremia.  - continue metoprolol succinate (mainly for A. fib)

## 2019-01-10 NOTE — CHART NOTE - NSCHARTNOTEFT_GEN_A_CORE
RRT called at 10:21 AM for unresponsiveness, absent pulse on palpation, respiratory failure. Patient is a 89F with dementia, afib on eliquis, CKD, ITP, aortic stenosis, possible CHF presenting with hypernatremia, AMS, vomiting. On my arrival patient was being ventilated with bag mask however multiple RN and providers stated pulse was absent. Patient had no mental status and was not breathing. MOLST was at bedside and reviewed by myself which stated clearly patient is DNR/DNI. At this point we stated that patient was not to be resuscitated or intubated per wishes in MOLST form. We quickly reviewed events leading up to RRT and I was told that she was being fed by HHA and started vomiting. Patient then became unresponsive. Monitor displayed ppm activity however exam demonstrated absent heart sounds, absent respirations, no pupillary reflex, absent response to painful stimuli.     At this point RRT was ended. Primary team advised to please contact the family, medical examiner given death within 24 hours of hospitalization, and EP to turn off ppm if possible.       Christa Burger, PGY 3  Internal Medicine  Pager [phone] | [phone] RRT called at 10:21 AM for unresponsiveness, absent pulse on palpation, respiratory failure. Patient is a 89F with dementia, afib on eliquis, CKD, ITP, aortic stenosis, possible CHF presenting with hypernatremia, AMS, vomiting. On my arrival patient was being ventilated with bag mask however multiple RN and providers stated pulse was absent. Patient had no mental status and was not breathing. MOLST was at bedside and reviewed by myself which stated clearly patient is DNR/DNI. At this point we stated that patient was not to be resuscitated or intubated per wishes in MOLST form. We quickly reviewed events leading up to RRT and I was told that she was being fed by HHA and started vomiting. Patient then became unresponsive. Monitor displayed ppm activity however exam demonstrated absent heart sounds, absent respirations, no pupillary reflex, absent response to painful stimuli.     At this point RRT was ended. Primary team advised to please contact the family, medical examiner given death within 24 hours of hospitalization, and EP to turn off ppm if possible/indicated.       Christa Burger, PGY 3  Internal Medicine  Pager [phone] | [phone]

## 2019-01-10 NOTE — H&P ADULT - NSHPLABSRESULTS_GEN_ALL_CORE
01-10    164<HH>  |  121<H>  |  102<H>  ----------------------------<  173<H>  4.0   |  22  |  2.74<H>    Ca    8.9      10 Trent 2019 00:39    TPro  7.0  /  Alb  3.6  /  TBili  0.5  /  DBili  x   /  AST  24  /  ALT  16  /  AlkPhos  109  01-10    CBC Full  -  ( 10 Trent 2019 00:39 )  WBC Count : 10.4 K/uL  Hemoglobin : 14.5 g/dL  Hematocrit : 46.4 %  Platelet Count - Automated : 118 K/uL  Mean Cell Volume : 93.7 fl  Mean Cell Hemoglobin : 29.3 pg  Mean Cell Hemoglobin Concentration : 31.2 gm/dL  Auto Neutrophil # : 8.8 K/uL  Auto Lymphocyte # : 0.5 K/uL  Auto Monocyte # : 0.9 K/uL  Auto Eosinophil # : 0.1 K/uL  Auto Basophil # : 0.0 K/uL  Auto Neutrophil % : 84.5 %  Auto Lymphocyte % : 5.2 %  Auto Monocyte % : 9.0 %  Auto Eosinophil % : 0.9 %  Auto Basophil % : 0.4 %    urinalysis (done outpatient) 1/9/19: large leukocyte esterase, turbid urine, WBC >720, bacteria too numerous to count.

## 2019-01-10 NOTE — PROGRESS NOTE ADULT - PROBLEM SELECTOR PLAN 3
UA done as outpatient the day of admission was positive.  Send urine cultures. Previous cultures had grown E. coli sensitive to ceftriaxone, resistant to cipro.   s/p 1g IV ceftriaxone. Continue IV ceftriaxone for UTI.  Patient has had multiple UTI's in the past per [employer] records- can tailor abx according to cultures. UA done as outpatient the day of admission was positive. Send urine cultures. Previous cultures had grown E. coli sensitive to ceftriaxone, resistant to cipro.   s/p 1g IV ceftriaxone. Continue IV ceftriaxone for UTI.    - Patient has had multiple UTI's in the past per [employer] records- can tailor abx according to cultures.

## 2019-01-10 NOTE — ED ADULT NURSE REASSESSMENT NOTE - NS ED NURSE REASSESS COMMENT FT1
IV noted to be bleeding outside of catheter; IV removed and pressure bandaging placed to IV site on left hand; new 20 chemo IV placed in left AC, pt tolerated well
initial IV placement unsuccessful, resulted in hematoma to right hand due to pt known history of ITP; MD aware; site bandaged and bleeding controlled; will reassess
Report received from Lurdes Nix RN. Pt AAOx1, NAD, resp nonlabored, skin warm/dry, resting comfortably in bed with family at bedside.  Pt denies headache, dizziness, chest pain, palpitations, SOB, abd pain, n/v/d, urinary symptoms, fevers, chills, weakness at this time. Pt awaiting bed upstairs. Safety maintained with call bell within reach.

## 2019-01-10 NOTE — H&P ADULT - NSHPPHYSICALEXAM_GEN_ALL_CORE
PHYSICAL EXAM:      Constitutional:    Eyes:    ENMT:    Neck:    Breasts:    Back:    Respiratory:    Cardiovascular:    Gastrointestinal:    Genitourinary:    Rectal:    Extremities:    Vascular:    Neurological:    Skin:    Lymph Nodes:    Musculoskeletal:    Psychiatric: Vital Signs Last 24 Hrs  T(C): 37.1 (10 Trent 2019 06:27), Max: 37.1 (10 Trent 2019 06:27)  T(F): 98.7 (10 Trent 2019 06:27), Max: 98.7 (10 Trent 2019 06:27)  HR: 82 (10 Trent 2019 06:27) (72 - 82)  BP: 130/65 (10 Trent 2019 06:27) (112/56 - 130/65)  BP(mean): --  RR: 16 (10 Trent 2019 06:27) (16 - 20)  SpO2: 99% (10 Trent 2019 06:27) (95% - 99%)      Constitutional: In no acute distress, frail elderly female   Eyes: PERRL, clear conjunctiva  ENMT: dry mucous membranes   Respiratory: Lungs CTAB; no wheezes, rales or rhonchi. +Upper respiratory congestion sounds  Cardiovascular: Normal S1 and S2; no murmurs. No lower extremity edema   Gastrointestinal: Abdomen soft, nontender, nondistended, normal bowel sounds  Genitourinary: pt in diaper  Vascular: 2+ peripheral pulses  Psychiatric: A&O x 1 (knows name); alert, arousable to voice and touch; follows commands

## 2019-01-10 NOTE — CONSULT NOTE ADULT - SUBJECTIVE AND OBJECTIVE BOX
CHIEF COMPLAINT: weakness and lethargy    HPI:  89F pmh dementia (AO x 1-2), HTN, Afib (on eliquis), HLD, CAD s/p stent 2013, CKD, ITP (s/p bone marrow biopsy), aortic stenosis, and ?CHF p/w lethargy, decreased PO intake. Pt had a house visit today, blood and urine checked and family was called at night to report high sodium.  Daughters state that she is more lethargic and confused and they think she has a urine infection because similar episodes have happened before.  Aid states patient was spitting up food and liquids today.  Per family, patient has had CHF when receiving fluids in the hospital on prior visits. Family reports that mental status is AAOx1-2, waxing and waning. Pt's family explained that they would not want intubation or CPR if pt's heart or lungs were to fail. Home care doctor asked family not to give pt Losartan, Lasix, or potassium for the time being in the setting of hypotension.     PAST MEDICAL & SURGICAL HISTORY:  Aortic stenosis  CHF (congestive heart failure)  CKD (chronic kidney disease)  HLD (hyperlipidemia)  CAD (coronary artery disease)  HTN (hypertension)  A-fib  Thrombocytopenia  History of cholecystectomy    FAMILY HISTORY:  No pertinent family history in first degree relatives. Both parents , unclear of cause of death.      SOCIAL HISTORY:  Never smoker, never EtOH, never drugs.    Allergies  sulfa drugs (Other)    REVIEW OF SYSTEMS:  Limited as pt is AAOx1 at time of exam. Pt reported feeling weak. Pt denies fevers, chills, chest pain, SOB, abd pain.     OBJECTIVE:  T(C): 36.8 (19 @ 23:24), Max: 36.8 (19 @ 23:24)  HR: 79 (01-10-19 @ 03:04) (72 - 79)  BP: 114/72 (01-10-19 @ 03:04) (112/56 - 114/72)  RR: 16 (01-10-19 @ 03:04) (16 - 20)  SpO2: 95% (01-10-19 @ 03:04) (95% - 98%)    GENERAL: elderly woman, appears weak  HEAD: dry moral mucosa. Atraumatic, Normocephalic  EYES: EOMI, PERRLA, conjunctiva and sclera clear  NECK: Supple, No JVD  CHEST/LUNG: Clear to auscultation bilaterally; No wheeze  HEART: Regular rate and rhythm; No murmurs, rubs, or gallops  ABDOMEN: Tender over suprapubic region, no CVA tenderness. Soft, Nontender, Nondistended; Bowel sounds present  EXTREMITIES:  2+ Peripheral Pulses, No clubbing, cyanosis, or edema  PSYCH: AAOx1  NEUROLOGY: non-focal  SKIN: No rashes or lesions      LABS:                        14.5   10.4  )-----------( 118      ( 10 Trent 2019 00:39 )             46.4     01-10    164<HH>  |  121<H>  |  102<H>  ----------------------------<  173<H>  4.0   |  22  |  2.74<H>    Ca    8.9      10 Trent 2019 00:39    TPro  7.0  /  Alb  3.6  /  TBili  0.5  /  DBili  x   /  AST  24  /  ALT  16  /  AlkPhos  109  01-10    PT/INR - ( 10 Trent 2019 00:39 )   PT: 17.7 sec;   INR: 1.53 ratio      PTT - ( 10 Trent 2019 00:39 )  PTT:34.8 sec    Venous Blood Gas:  01-10 @ 02:08  7.41/44/58/27/86  VBG Lactate: 2.4

## 2019-01-10 NOTE — CONSULT NOTE ADULT - ASSESSMENT
89F pmh dementia (aaox1-2 at baseline), afib on eliquis, AS, CKD, ?CHF being admitted for UTI and hypernatremia.     #Hypernatremia  Likely due to free water deficit in setting of elderly woman, poor PO intake.   -would check urine and blood osm  -would check urine lytes  -agree with nephrology in treatment (250cc boluses of LR q1hr, recheck after 4hr, then possible D5W if warrented)  -goal 8,Eq/L per 24 hour period      #UTI  LE, WBC, and bacteria found on U/A from home (on HIE)  -would send Uclx for sensitivities  -agree with Ceftriaxone for now until sensitivities return    #Advanced Care Planning  Spoke with family at bedside who mentioned that it has been discussed between all the healthcare proxys (pt's children) and determined that they would not be interested in CPR or Intubation.   -MOLST form completed mentioning DNR/I, but nothing indicated for abx, fluids, tube feeds  -Would complete another MOLST form when all children available    Pt is NOT candidate for MICU at this time. Please call with questions or concerns.    ETIENNE Olson MD-PGY2  Internal Medicine Department  Pager: [phone] / [phone] 89F pmh dementia (aaox1-2 at baseline), afib on eliquis, AS, CKD, ?CHF being admitted for UTI and hypernatremia.     #Hypernatremia  Likely due to free water deficit in setting of elderly woman, poor PO intake.   -would check urine and blood osm  -would check urine lytes  -agree with nephrology in treatment (250cc boluses of LR q1hr, recheck after 4hr, then possible D5W if warranted)  -goal 8,Eq/L per 24 hour period    #UTI  LE, WBC, and bacteria found on U/A from home (on HIE)  -would send Uclx for sensitivities  -agree with Ceftriaxone for now until sensitivities return    #Advanced Care Planning  Spoke with family at bedside who mentioned that it has been discussed between all the healthcare proxys (pt's children) and determined that they would not be interested in CPR or Intubation.   -MOLST form completed mentioning DNR/I, but nothing indicated for abx, fluids, tube feeds  -Would complete another MOLST form when all children available    Pt is NOT candidate for MICU at this time. Please call with questions or concerns.    ETIENNE Olson MD-PGY2  Internal Medicine Department  Pager: [phone] / [phone]

## 2019-01-10 NOTE — ED PROVIDER NOTE - PHYSICAL EXAMINATION
Gen: NAD, AOx1, on O2 via nc  Head: NCAT  HEENT: PERRL, oral mucosa dry, normal conjunctiva  Lung: poor inspiratory effort, possibly diminished at bilateral bases, no resp distress  CV: irregular rhythm, no murmurs, Normal perfusion  Abd: soft, NTND  MSK: No edema or visible deformities  Skin: bruising on hand

## 2019-01-10 NOTE — PROGRESS NOTE ADULT - PROBLEM SELECTOR PLAN 4
EKG showing A. fib with normal HR in 70's.   Continue eliquis 2.5 mg BID.  Continue metoprolol succinate 50 mg QD. EKG showing A. fib with normal HR in 70's.     - Continue AC eliquis 2.5 mg BID.  - Continue rate control metoprolol succinate 50 mg QD.

## 2019-01-10 NOTE — H&P ADULT - HISTORY OF PRESENT ILLNESS
Patient is a 89 year old female with dementia (baseline A&O x 1-2), A. fib on eliquis, CKD, ITP (s/p bone marrow biopsy), aortic stenosis, ?CHF, presenting overnight after home medical services were called due to lethargy and confusion, noted by family members. She had labs checked outpatient which revealed hypernatremia to 169. Patient also had a positive urinalysis prior to coming to hospital. Per family, patient was spitting up food and liquids yesterday. Her mental status acutely worsened. Per outpatient home care note, patient's lasix and losartan were held due to hypertension yesterday.     In the ED, patient was afebrile, T 98.2, HR 72, /56, satting 96% requriing 6L NC. Labs notable for initial Na 164, WBC 10.4, BUN/Cr elevated to 102/2.74, and outpatient UA from yesterday showing numerous bacteria and WBC's, with +LE. Patient given 250 cc LR x 2, pending repeat sodium level. Patient is a 89 year old female with dementia (baseline A&O x 1-2), bedbound, A. fib on eliquis, CKD, ITP (s/p bone marrow biopsy), aortic stenosis, ?CHF, presenting overnight after home medical services were called due to lethargy and confusion, noted by family members. She had labs checked outpatient which revealed hypernatremia to 169. Patient also had a positive urinalysis prior to coming to hospital. Per family, patient was spitting up food and liquids yesterday. Her mental status acutely worsened. Per outpatient home care note, patient's lasix and losartan were held due to hypertension yesterday. Patient has a 24/7 home health aide who reported that currently, after receiving fluids, patient's mental status is a little improved, she is more alert and responding to questions. However, yesterday patient's eyes rolled back and she was almost unresponsive. Per the aide, patient with no fevers, chills, nausea, vomiting (although she was spititng up food), no diarrhea. Patient is incontinent of bowel and bladder and wears diaper. She cannot ambulate.     In the ED, patient was afebrile, T 98.2, HR 72, /56, satting 96% requriing 6L NC. Labs notable for initial Na 164, WBC 10.4, BUN/Cr elevated to 102/2.74, and outpatient UA from yesterday showing numerous bacteria and WBC's, with +LE. Patient given 250 cc NS followed by 250 cc LR x 2, pending repeat sodium level.

## 2019-01-10 NOTE — H&P ADULT - PROBLEM SELECTOR PLAN 4
EKG showing A. fib with normal HR in 70's.   Continue eliquis 2.5 mg BID.  Continue metoprolol succinate 50 mg QD.

## 2019-01-10 NOTE — ED PROVIDER NOTE - OBJECTIVE STATEMENT
89F pmh dementia (AO x 1-2), HTN, Afib, HLD, CAD s/p stent 2013, CKD, ITP (s/p bone marrow biopsy), and aortic stenosis p/w lethargy, decreased PO intake.  Pt had a house visit today, blood and urine checked and family was called at night to report high sodium.  Daughters state that she is more lethargic and confused and they think she has a urine infection because similar episodes have happened before.  Aid states patient was spitting up food and liquids today.  Per family, patient has had CHF when receiving fluids in the hospital on prior visits.

## 2019-01-10 NOTE — H&P ADULT - ASSESSMENT
89 year old female with dementia (baseline A&O x 1-2), bedbound, A. fib on eliquis, CKD, ITP (s/p bone marrow biopsy), aortic stenosis, ?CHF, admitted for hypernatremia to Na 169 (outpatient), 164 (inpatient), likely in setting of poor PO intake in elderly patient with dementia. 89 year old female with dementia (baseline A&O x 1-2), bedbound, A. fib on eliquis, CKD, ITP (s/p bone marrow biopsy), aortic stenosis, ?CHF, hypothyroidism, admitted for hypernatremia to Na 169 (outpatient), 164 (inpatient), likely in setting of poor PO intake in elderly patient with dementia.

## 2019-01-10 NOTE — H&P ADULT - PROBLEM SELECTOR PLAN 3
UA done as outpatient the day of admission was positive.  Send urine cultures.  s/p 1g IV ceftriaxone. Continue IV ceftriaxone for UTI.  Patient has had multiple UTI's in the past per [employer] records- can tailor abx according to cultures. UA done as outpatient the day of admission was positive.  Send urine cultures. Previous cultures had grown E. coli sensitive to ceftriaxone, resistant to cipro.   s/p 1g IV ceftriaxone. Continue IV ceftriaxone for UTI.  Patient has had multiple UTI's in the past per [employer] records- can tailor abx according to cultures.

## 2019-01-10 NOTE — H&P ADULT - PROBLEM SELECTOR PLAN 7
PLTS 118k at this time, stable, no active bleeding.   In setting of ITP (diagnosed by bone marrow biopsy)  Continue to monitor.

## 2019-01-10 NOTE — PROGRESS NOTE ADULT - PROBLEM SELECTOR PLAN 5
JOHN - Pt with creatinine of 2.74 (no recent creatinine- 2.74, last known Cr was 1.52 4/2018).   BUN/Cr ratio>20, likely pre-renal in setting of reported poor PO intake with spitting up food/liquids.  - would check urine lytes JOHN - Pt with creatinine of 2.74 (no recent creatinine- 2.74, last known Cr was 1.52 4/2018).     - BUN/Cr ratio>20, likely pre-renal in setting of reported poor PO intake with spitting up food/liquids.  - f/u Urine studies.

## 2019-01-10 NOTE — CHART NOTE - NSCHARTNOTEFT_GEN_A_CORE
RRT called for unresponsiveness.     On physical exam, patient did not respond to verbal or physical stimuli.  No spontaneous respirations were observed.  Absent heart and breath sounds on auscultation.  Absent radial, femoral, and carotid pulses on palpation.  Pupils appeared fixed and dilated, nonreactive to light.  No response to noxious stimuli.  Asystole observed on cardiac monitor.      Patient was pronounced dead by Dr. Christa Burger at 10:28 AM.  Attending Dr. Fenton present.  Family notified via phone.     Aleshia Jeronimo MD PGY2  Pager [phone]

## 2019-01-10 NOTE — CHART NOTE - NSCHARTNOTEFT_GEN_A_CORE
EMERGENCY SOCIAL WORK:  Pt has MOLST form in chart, DNR/DNI in place.  Pt admitted to medicine. Rapid Response called in ED holding area.  Aide Haily at bedside, tearful.  Per team 3, pt .  LMSW provided supportive intervention for family/aide at bedside. Chaplaincy called. EMERGENCY SOCIAL WORK: 89 year old, white, female presents with c/o hypernatremia/lethargy  Pmhx: dementia, bedbound, afib, on eliquis, CKD, CHF sent in due to family concern for increase confusion/lethargy. Pt has MOLST form in chart, DNR/DNI in place.  Pt admitted to medicine team 3 (pager [phone]). Rapid Response called in ED holding area. Per Team 3 pt , time of death called 10:28. Team 3 notified son [phone] ph pt death.  Aide Haily at bedside, tearful. LMSW provided supportive intervention/brief grief counseling for family/aide at bedside. Chaplaincy consulted x2.  came provided last rights blessing with family at bedside. LMSW made RN Baptism and ED Holding manager aware family leaving bedside. Family reports they will make  arrangements later today and identify  home. Social work made family aware social work availability. Social work to remain available. EMERGENCY SOCIAL WORK: 89 year old, white, female presents with c/o hypernatremia/lethargy.  Pmhx: dementia, bedbound, afib, on eliquis, CKD, CHF sent in due to family concern for increase confusion/lethargy. Pt has MOLST form in chart, DNR/DNI in place.  Pt admitted to medicine team 3 (pager [phone]). Rapid Response called in ED holding area. Per Team 3 pt , time of death called 10:28. Team 3 notified son [phone] ph pt death.  Aide Haily at bedside, tearful. LMSW provided supportive intervention/brief grief counseling for family/aide at bedside. Chaplaincy consulted x2.  came provided last rights blessing with family at bedside. LMSW made RN Nondenominational and ED Holding manager aware family leaving bedside. Family reports they will make  arrangements later today and identify  home. Social work made family aware social work availability. Social work to remain available.

## 2019-01-10 NOTE — CONSULT NOTE ADULT - ATTENDING COMMENTS
Patient seen and examined.  Agree with resident note as above.  Patient with hx as noted including dementia, severe AS, dCHF and frequent UTIs who presented to ER with decreased po and lethargy.  Found on outpt labs to have +UA.  In ER found to have JOHN, hypernatremia.  Patient has stable hemodynamics and is protecting airway,  Agree with cautious isotonic volume resus followed by free water correction with goal decrease in sodium of 6-8meq/d.  Full recs as above and I have edited where appropriate.  DNR DNI.  Will need more extended goals of care discussion once the rest of her family arrives.

## 2019-01-10 NOTE — PROGRESS NOTE ADULT - PROBLEM SELECTOR PLAN 2
Likely multifactorial- hypernatremia along with suspected UTI based on outpatient UA, superimposed on existing dementia in an elderly female.  - per home aide at bedside, her mental status is already improving, almost back to baseline (pt now responding to questions). Likely multifactorial- hypernatremia along with suspected UTI based on outpatient UA, superimposed on existing dementia in an elderly female.    - per home aide at bedside, her mental status is already improving, almost back to baseline (pt now responding to questions).

## 2019-01-11 LAB
CULTURE RESULTS: NO GROWTH — SIGNIFICANT CHANGE UP
SPECIMEN SOURCE: SIGNIFICANT CHANGE UP

## 2019-01-21 ENCOUNTER — TRANSCRIPTION ENCOUNTER (OUTPATIENT)
Age: 84
End: 2019-01-21

## 2019-05-13 NOTE — PATIENT PROFILE ADULT - TOBACCO USE
Received phone message from Estrella stating that she is not interested in proceeding with genetic testing at this time. She is interested in undergoing genetic testing; however, she would prefer to wait several years. She will contact us with additional questions or concerns in the meantime.
Never smoker

## 2020-05-15 NOTE — DISCHARGE NOTE ADULT - HOSPITAL COURSE
89F PMH dementia (AO x 1-2), HTN, Afib, HLD, CAD s/p stent 2013, CKD, ITP (s/p bone marrow biopsy), and aortic stenosis was sent to [facility] from urgent care for persistent nausea and vomiting and confusion. On admission, she would found to have hypertensive urgency with SBPs up to 230s and metabolic encephalopathy due to UTI. She was started on CTX for UTI and completed 3 doses. For hypertensive urgency, she was given IVP labetalol and her BPs improved to SBPs 170s-180s. Her family was unreachable initially, so it wasn't until the following day that a medication reconciliation could be performed. She was initially started on amlodipine for her BP, but this was transitioned to her home mediations losartan, metoprolol, and lasix. She initially required some oxygen (2L NC intermittently) but this was presumed secondary to mild heart failure as her lasix was held for two days given her infection. UCx grew 100K CFU E. coli. Sensitivities were still pending at time of discharge, but given that she was clinically improved, broadening of her antibiotic coverage was not deemed necessary. She was continued on apixaban for stroke prophylaxis for afib and levothyroxine for hypothyroidism. Discussion was had with her daughter of whether the patient could be discharged late on Wednesday night versus Thursday morning, and her daughter preferred taking her mother home on Wednesday evening after the final dose of CTX. The patient has 24 hour HHA and will be transported home by her daughter. She was discharged in improved and stable condition with instructions to follow up with her PCP within 1-2 weeks. no 89F PMH dementia (AO x 1-2), HTN, Afib, HLD, CAD s/p stent 2013, CKD, ITP (s/p bone marrow biopsy), and aortic stenosis was sent to [facility] from urgent care for persistent nausea and vomiting and confusion. On admission, she would found to have hypertensive urgency with SBPs up to 230s and metabolic encephalopathy due to UTI. She was started on CTX for UTI and completed 3 doses. For hypertensive urgency, she was given IVP labetalol and her BPs improved to SBPs 170s-180s. Her family was unreachable initially, so it wasn't until the following day that a medication reconciliation could be performed. She was initially started on amlodipine for her BP, but this was transitioned to her home mediations losartan, metoprolol, and lasix. She initially required some oxygen (2L NC intermittently) but this was presumed secondary to mild heart failure as her lasix was held for two days given her infection. UCx grew 100K CFU E. coli. Sensitivities were still pending at time of discharge, but given that she was clinically improved, broadening of her antibiotic coverage was not deemed necessary. She was continued on apixaban for stroke prophylaxis for afib and levothyroxine for hypothyroidism. Discussion was had with her daughter of whether the patient could be discharged late on Wednesday night versus Thursday morning, and her daughter preferred taking her mother home on Wednesday evening after the final dose of CTX. The patient has 24 hour HHA and will be transported home by her daughter. She was discharged in improved and stable condition with instructions to follow up with her PCP within 1-2 weeks.    Discharge Diagnosis:  Metabolic encephalopathy due to UTI  UTI - Ecoli  Hypertensive Urgency  CKD (chronic kidney disease) stage 3  Atrial fibrillation and flutter  Chronic Systolic Heart Failure  Aortic Stenosis  Thrombocytopenia  Depression

## 2022-09-13 NOTE — ED ADULT NURSE NOTE - PAIN: BODY LOCATION
Called Patient Emergency Contact regarding physical forms    Patient's Emergency contact stated she has copy of immunizations and vision exam from [facility] and wanted to know if they could be sent separately head

## 2023-11-08 NOTE — DIETITIAN INITIAL EVALUATION ADULT. - PROBLEM SELECTOR PLAN 1
Clinic Care Coordination Contact  Community Health Worker Initial Outreach    CHW Initial Information Gathering:  Referral Source: PCP  Preferred Hospital: [facility] [place]  [phone]  Current living arrangement:: Other  Type of residence:: Other (living in a hotel, has autistic son with her)  Supplies Currently Used at Home: None  Equipment Currently Used at Home: none  Informal Support system:: None  No PCP office visit in Past Year: No  Transportation means:: Regular car  CHW Additional Questions  If ED/Hospital discharge, follow-up appointment scheduled as recommended?: N/A  Medication changes made following ED/Hospital discharge?: N/A  MyChart active?: Yes  Patient sent Social Determinants of Health questionnaire?: No    CHW introduced self and role with CC  Patient shares at length today of her current situation.   She has wiped her [facility] account, hasn't had income for 1 year. Does not have health insurance or any financial supports. Pt states that she started with the [facility] in June for trying to get a brain waiver (found out about this online herself). Pt shares that she had a concussion 1 year ago and has a diagnosed brain injury.  Pt has been calling and checking in with the [facility] often, spoke with someone who had called to help with an application, thought it was for insurance. To CHW, it sounds like she did the [employer] application for MA/SNAP/CASH assistance. Pt states she filled it out over the phone, sent forms to sign, scanned and sent back about 3 or 4 weeks ago.   She was supposed to be getting an email with the info and assessment date over a month ago and never heard anything back.     Patient shares that her autistic son lives with her, she has never had any help financially with him. Ex  was killed 1 yr ago. Wondering about death benefits? Will ask CARIDAD PETERSEN.  CHW discussed applying for [employer], patient states that she received a [employer] message  from financial assistance on 8/10/23, this person sent her a corina care/ financial assistance application which she completed on 11/2.      When filling out CHW questionnaire, pt reports that she is living in a hotel and has been for a year- paying $3,000 a month. Housing is also an issue for her, almost completely out of money and not sure where to go. Would like to discuss housing with [facility].    Patient accepts CC: Yes. Patient scheduled for assessment with [facility] on 11/10/23 at 10:00am. Patient noted desire to discuss financial, housing, and health insurance supports.     Monisha Wakefield, LASHONDA, B.S. RUST Care Coordination  [facility]:  [place], [place]  [phone]  [email or web address]       Patient presenting w/ n/v and noted to be delirious on exam 2/2 UTI per UA  - Unable to obtain history given patient's mental status and unable to reach family per phone numbers listed  - dosed w/ ceftriaxone in the ED, urine cx collected. will collect blood cx given metabolic encephalopathy and urgent care wbc documenting wbc to 13  -sepsis criteria not met

## 2024-02-07 NOTE — HEALTH RISK ASSESSMENT
I have attempted to contact    Mrs. Chavez    . There is no answer at the following phone number   [phone]  . I have left a voice mail message for the patient to contact Dr. Ravi  office nurse at [phone] to schedule an annual appointment as recommended by Dr. Davidson in her last telemedicine note.   Peyton Wynn RN BSN     [HRA Reviewed] : Health risk assessments reviewed

## 2024-07-16 NOTE — PROGRESS NOTE ADULT - PROBLEM/PLAN-1
FYI - Status Update    Who is Calling: Lydia RN, [facility]  [phone]    Update:   Pt declined SN visit this week due to multiple doctor appointments. Will resume regular SN visits next week on 7/23/24.     Does caller want a call/response back: Ruby Sheth on 7/16/2024 at 3:54 PM     DISPLAY PLAN FREE TEXT
